# Patient Record
Sex: FEMALE | Race: WHITE | NOT HISPANIC OR LATINO | URBAN - METROPOLITAN AREA
[De-identification: names, ages, dates, MRNs, and addresses within clinical notes are randomized per-mention and may not be internally consistent; named-entity substitution may affect disease eponyms.]

---

## 2017-05-15 ENCOUNTER — APPOINTMENT (OUTPATIENT)
Dept: URBAN - METROPOLITAN AREA CLINIC 200 | Age: 77
Setting detail: DERMATOLOGY
End: 2017-05-23

## 2017-05-15 DIAGNOSIS — L57.8 OTHER SKIN CHANGES DUE TO CHRONIC EXPOSURE TO NONIONIZING RADIATION: ICD-10-CM

## 2017-05-15 DIAGNOSIS — D485 NEOPLASM OF UNCERTAIN BEHAVIOR OF SKIN: ICD-10-CM

## 2017-05-15 DIAGNOSIS — L57.0 ACTINIC KERATOSIS: ICD-10-CM

## 2017-05-15 PROBLEM — Z85.828 PERSONAL HISTORY OF OTHER MALIGNANT NEOPLASM OF SKIN: Status: ACTIVE | Noted: 2017-05-15

## 2017-05-15 PROBLEM — Z85.3 PERSONAL HISTORY OF MALIGNANT NEOPLASM OF BREAST: Status: ACTIVE | Noted: 2017-05-15

## 2017-05-15 PROBLEM — D48.5 NEOPLASM OF UNCERTAIN BEHAVIOR OF SKIN: Status: ACTIVE | Noted: 2017-05-15

## 2017-05-15 PROCEDURE — OTHER COUNSELING: OTHER

## 2017-05-15 PROCEDURE — 11100: CPT | Mod: 59

## 2017-05-15 PROCEDURE — OTHER BIOPSY BY SHAVE METHOD: OTHER

## 2017-05-15 PROCEDURE — 17000 DESTRUCT PREMALG LESION: CPT

## 2017-05-15 PROCEDURE — 99213 OFFICE O/P EST LOW 20 MIN: CPT | Mod: 25

## 2017-05-15 PROCEDURE — OTHER LIQUID NITROGEN: OTHER

## 2017-05-15 ASSESSMENT — LOCATION DETAILED DESCRIPTION DERM
LOCATION DETAILED: RIGHT SUPERIOR UPPER BACK
LOCATION DETAILED: RIGHT PROXIMAL DORSAL FOREARM
LOCATION DETAILED: RIGHT ANTERIOR SHOULDER

## 2017-05-15 ASSESSMENT — LOCATION SIMPLE DESCRIPTION DERM
LOCATION SIMPLE: RIGHT SHOULDER
LOCATION SIMPLE: RIGHT UPPER BACK
LOCATION SIMPLE: RIGHT FOREARM

## 2017-05-15 ASSESSMENT — LOCATION ZONE DERM
LOCATION ZONE: ARM
LOCATION ZONE: TRUNK

## 2017-05-15 NOTE — PROCEDURE: BIOPSY BY SHAVE METHOD
Notification Instructions: Patient will be notified of biopsy results. However, patient instructed to call the office if not contacted within 2 weeks.
Additional Anesthesia Volume In Cc (Will Not Render If 0): 0
Biopsy Type: H and E
Post-Care Instructions: I reviewed with the patient in detail post-care instructions. Patient is to keep the biopsy site dry overnight, and then apply bacitracin twice daily until healed. Patient may apply hydrogen peroxide soaks to remove any crusting.
Wound Care: Vaseline
Billing Type: Third-Party Bill
Anesthesia Volume In Cc (Will Not Render If 0): 0.1
Bill For Surgical Tray: no
Size Of Lesion In Cm: 0.3
Biopsy Method: Personna blade
Dressing: bandage
consent was obtained and risks were reviewed including but not limited to scarring, infection, bleeding, scabbing, incomplete removal, nerve damage and allergy to anesthesia.
Anesthesia Type: 1% lidocaine with epinephrine
Type Of Destruction Used: Curettage
Hemostasis: Drysol
Detail Level: Detailed

## 2019-09-04 ENCOUNTER — HOSPITAL ENCOUNTER (OUTPATIENT)
Facility: CLINIC | Age: 79
Discharge: HOME | End: 2019-09-04
Attending: FAMILY MEDICINE
Payer: COMMERCIAL

## 2019-09-04 VITALS
WEIGHT: 123 LBS | SYSTOLIC BLOOD PRESSURE: 138 MMHG | TEMPERATURE: 98.2 F | OXYGEN SATURATION: 97 % | RESPIRATION RATE: 14 BRPM | DIASTOLIC BLOOD PRESSURE: 82 MMHG | HEART RATE: 68 BPM

## 2019-09-04 DIAGNOSIS — K12.0 CANKER SORES ORAL: Primary | ICD-10-CM

## 2019-09-04 PROCEDURE — 99202 OFFICE O/P NEW SF 15 MIN: CPT | Performed by: NURSE PRACTITIONER

## 2019-09-04 PROCEDURE — S9083 URGENT CARE CENTER GLOBAL: HCPCS | Performed by: NURSE PRACTITIONER

## 2019-09-04 RX ORDER — LIDOCAINE HYDROCHLORIDE 20 MG/ML
JELLY TOPICAL AS NEEDED
Qty: 30 ML | Refills: 0 | Status: SHIPPED | OUTPATIENT
Start: 2019-09-04 | End: 2020-09-03

## 2019-09-04 ASSESSMENT — ENCOUNTER SYMPTOMS
SEIZURES: 0
ABDOMINAL PAIN: 0
COUGH: 0
SORE THROAT: 0
COLOR CHANGE: 0
ARTHRALGIAS: 0
SHORTNESS OF BREATH: 0
VOMITING: 0
PALPITATIONS: 0
EYE PAIN: 0
CHILLS: 0
BACK PAIN: 0
FEVER: 0
DYSURIA: 0
HEMATURIA: 0

## 2019-09-04 NOTE — DISCHARGE INSTRUCTIONS
Please rinse with mouthwash 2 - 4 x per day and rinse with Peroxyl by [employer] after eating.  Use Lidocaine jelly for pain to numb area.  Call a Primary Care Physician tomorrow for follow up regarding your weight loss    Thank you for choosing our Urgent Care.

## 2019-09-04 NOTE — ED PROVIDER NOTES
HPI     Chief Complaint   Patient presents with   • canker sores       Pt. Reports mouth sores and swelling x 1 month which have been unrelieved with over the counter treatments.  Pt. Reports difficulty eating and drinking but no issues swallowing.  Pt. Admits to weight loss.  Pt. Denies any hx of immunosuppression.  Pt. Does not yet have a Primary Care Physician since her PCP retired.               Patient History     No past medical history on file.    No past surgical history on file.    No family history on file.    Social History   Substance Use Topics   • Smoking status: Not on file   • Smokeless tobacco: Not on file   • Alcohol use Not on file       Systems Reviewed from Nursing Triage:  Allergies  Meds  Problems          Review of Systems     Review of Systems   Constitutional: Negative for chills and fever.   HENT: Negative for ear pain and sore throat.         Mouth sores   Eyes: Negative for pain and visual disturbance.   Respiratory: Negative for cough and shortness of breath.    Cardiovascular: Negative for chest pain and palpitations.   Gastrointestinal: Negative for abdominal pain and vomiting.   Genitourinary: Negative for dysuria and hematuria.   Musculoskeletal: Negative for arthralgias and back pain.   Skin: Negative for color change and rash.   Neurological: Negative for seizures and syncope.   All other systems reviewed and are negative.       Physical Exam     ED Triage Vitals [09/04/19 1753]   Temp Heart Rate Resp BP SpO2   36.8 °C (98.2 °F) 68 14 138/82 97 %      Temp src Heart Rate Source Patient Position BP Location FiO2 (%) (Set)   -- -- -- -- --                     Patient Vitals for the past 24 hrs:   BP Temp Pulse Resp SpO2 Weight   09/04/19 1753 138/82 36.8 °C (98.2 °F) 68 14 97 % 55.8 kg (123 lb)           Physical Exam   Constitutional: She appears well-developed and well-nourished. No distress.   HENT:   Head: Normocephalic and atraumatic.   Mouth/Throat: Uvula is midline,  oropharynx is clear and moist and mucous membranes are normal. Oral lesions present.   Mouth sores - under tongue, mild stomatitis and tenderness, moist mucus membranes   Eyes: Conjunctivae are normal.   Neck: Neck supple.   Cardiovascular: Normal rate and regular rhythm.    No murmur heard.  Pulmonary/Chest: Effort normal and breath sounds normal. No respiratory distress.   Abdominal: Soft. There is no tenderness.   Musculoskeletal: She exhibits no edema.   Neurological: She is alert.   Skin: Skin is warm and dry.   Psychiatric: She has a normal mood and affect.   Nursing note and vitals reviewed.           Procedures    ED Course & MDM     Labs Reviewed - No data to display    No orders to display               MDM  Number of Diagnoses or Management Options  Canker sores oral:   Diagnosis management comments: Canker sores  Stomatitis   Magic Mouthwash  Peroxyl rinse  Xylocaine - pain  Follow up with PCP - list provided           Clinical Impressions as of Sep 04 1827   Canker sores oral        Laureen Walker CRNP  09/04/19 1831       Laureen Walker CRNP  09/04/19 1831

## 2019-09-05 NOTE — ED ATTESTATION NOTE
Reviewed  and supervised the care ,I agreed with the assessment and plan      Isaura Enriquez MD  09/05/19 0996

## 2019-09-10 ENCOUNTER — TRANSCRIBE ORDERS (OUTPATIENT)
Dept: SCHEDULING | Age: 79
End: 2019-09-10

## 2019-09-10 DIAGNOSIS — C50.911 MALIGNANT NEOPLASM OF RIGHT FEMALE BREAST (CMS/HCC): Primary | ICD-10-CM

## 2019-09-16 ENCOUNTER — HOSPITAL ENCOUNTER (OUTPATIENT)
Dept: RADIOLOGY | Facility: HOSPITAL | Age: 79
Discharge: HOME | End: 2019-09-16
Attending: NURSE PRACTITIONER
Payer: COMMERCIAL

## 2019-09-16 DIAGNOSIS — C50.911 MALIGNANT NEOPLASM OF RIGHT FEMALE BREAST (CMS/HCC): ICD-10-CM

## 2019-09-16 PROCEDURE — 76642 ULTRASOUND BREAST LIMITED: CPT | Mod: LT

## 2019-09-16 PROCEDURE — 77066 DX MAMMO INCL CAD BI: CPT

## 2020-04-24 ENCOUNTER — APPOINTMENT (OUTPATIENT)
Dept: URBAN - METROPOLITAN AREA CLINIC 200 | Age: 80
Setting detail: DERMATOLOGY
End: 2020-04-27

## 2020-04-24 DIAGNOSIS — L57.0 ACTINIC KERATOSIS: ICD-10-CM

## 2020-04-24 PROBLEM — J30.1 ALLERGIC RHINITIS DUE TO POLLEN: Status: ACTIVE | Noted: 2020-04-24

## 2020-04-24 PROCEDURE — OTHER CONSENT FOR TELEMEDICINE VISIT OBTAINED: OTHER

## 2020-04-24 PROCEDURE — OTHER TELEHEALTH ASSESSMENT: OTHER

## 2020-04-24 PROCEDURE — OTHER PRESCRIPTION: OTHER

## 2020-04-24 PROCEDURE — 99212 OFFICE O/P EST SF 10 MIN: CPT | Mod: 95

## 2020-04-24 PROCEDURE — OTHER REASON FOR TELEMEDICINE VISIT: OTHER

## 2020-04-24 RX ORDER — FLUOROURACIL 5 MG/G
CREAM TOPICAL
Qty: 1 | Refills: 4 | Status: ERX | COMMUNITY
Start: 2020-04-24

## 2020-04-24 ASSESSMENT — LOCATION ZONE DERM: LOCATION ZONE: ARM

## 2020-04-24 ASSESSMENT — LOCATION SIMPLE DESCRIPTION DERM: LOCATION SIMPLE: RIGHT FOREARM

## 2020-04-24 ASSESSMENT — LOCATION DETAILED DESCRIPTION DERM: LOCATION DETAILED: RIGHT PROXIMAL DORSAL FOREARM

## 2020-04-24 NOTE — PROCEDURE: TELEHEALTH ASSESSMENT
Recommendation Preamble: Visit done via [facility] Recommendation Preamble: Visit done via [facility]

## 2020-06-02 ENCOUNTER — TRANSCRIBE ORDERS (OUTPATIENT)
Dept: SCHEDULING | Age: 80
End: 2020-06-02

## 2020-06-02 DIAGNOSIS — C50.911 MALIGNANT NEOPLASM OF UNSPECIFIED SITE OF RIGHT FEMALE BREAST (CMS/HCC): ICD-10-CM

## 2020-06-02 DIAGNOSIS — R92.8 OTHER ABNORMAL AND INCONCLUSIVE FINDINGS ON DIAGNOSTIC IMAGING OF BREAST: Primary | ICD-10-CM

## 2020-06-02 DIAGNOSIS — E04.1 NONTOXIC SINGLE THYROID NODULE: ICD-10-CM

## 2020-06-29 ENCOUNTER — HOSPITAL ENCOUNTER (OUTPATIENT)
Dept: RADIOLOGY | Facility: HOSPITAL | Age: 80
Discharge: HOME | End: 2020-06-29
Attending: NURSE PRACTITIONER
Payer: COMMERCIAL

## 2020-06-29 ENCOUNTER — TRANSCRIBE ORDERS (OUTPATIENT)
Dept: REGISTRATION | Facility: HOSPITAL | Age: 80
End: 2020-06-29

## 2020-06-29 DIAGNOSIS — R92.8 OTHER ABNORMAL AND INCONCLUSIVE FINDINGS ON DIAGNOSTIC IMAGING OF BREAST: ICD-10-CM

## 2020-06-29 DIAGNOSIS — C50.911 MALIGNANT NEOPLASM OF UNSPECIFIED SITE OF RIGHT FEMALE BREAST (CMS/HCC): ICD-10-CM

## 2020-06-29 DIAGNOSIS — E04.1 NONTOXIC SINGLE THYROID NODULE: ICD-10-CM

## 2020-06-29 DIAGNOSIS — R92.8 OTHER ABNORMAL AND INCONCLUSIVE FINDINGS ON DIAGNOSTIC IMAGING OF BREAST: Primary | ICD-10-CM

## 2020-06-29 PROCEDURE — 77065 DX MAMMO INCL CAD UNI: CPT | Mod: LT

## 2020-06-29 PROCEDURE — 76536 US EXAM OF HEAD AND NECK: CPT

## 2020-06-29 PROCEDURE — 76642 ULTRASOUND BREAST LIMITED: CPT | Mod: LT

## 2020-07-27 ENCOUNTER — APPOINTMENT (OUTPATIENT)
Dept: URBAN - METROPOLITAN AREA CLINIC 200 | Age: 80
Setting detail: DERMATOLOGY
End: 2020-07-29

## 2020-07-27 DIAGNOSIS — Z85.828 PERSONAL HISTORY OF OTHER MALIGNANT NEOPLASM OF SKIN: ICD-10-CM

## 2020-07-27 DIAGNOSIS — L57.8 OTHER SKIN CHANGES DUE TO CHRONIC EXPOSURE TO NONIONIZING RADIATION: ICD-10-CM

## 2020-07-27 DIAGNOSIS — Z41.9 ENCOUNTER FOR PROCEDURE FOR PURPOSES OTHER THAN REMEDYING HEALTH STATE, UNSPECIFIED: ICD-10-CM

## 2020-07-27 PROBLEM — L57.0 ACTINIC KERATOSIS: Status: ACTIVE | Noted: 2020-07-27

## 2020-07-27 PROCEDURE — 99213 OFFICE O/P EST LOW 20 MIN: CPT

## 2020-07-27 PROCEDURE — OTHER LIQUID NITROGEN (COSMETIC): OTHER

## 2020-07-27 PROCEDURE — OTHER COUNSELING: OTHER

## 2020-07-27 ASSESSMENT — LOCATION DETAILED DESCRIPTION DERM
LOCATION DETAILED: LEFT MEDIAL SUPERIOR CHEST
LOCATION DETAILED: RIGHT CENTRAL MALAR CHEEK
LOCATION DETAILED: LEFT INFERIOR ANTERIOR NECK
LOCATION DETAILED: RIGHT SUPERIOR MEDIAL MALAR CHEEK
LOCATION DETAILED: LEFT RIB CAGE

## 2020-07-27 ASSESSMENT — LOCATION ZONE DERM
LOCATION ZONE: FACE
LOCATION ZONE: TRUNK
LOCATION ZONE: NECK
LOCATION ZONE: FACE
LOCATION ZONE: TRUNK

## 2020-07-27 ASSESSMENT — LOCATION SIMPLE DESCRIPTION DERM
LOCATION SIMPLE: LEFT ANTERIOR NECK
LOCATION SIMPLE: ABDOMEN
LOCATION SIMPLE: RIGHT CHEEK
LOCATION SIMPLE: CHEST
LOCATION SIMPLE: RIGHT CHEEK

## 2020-07-27 NOTE — PROCEDURE: LIQUID NITROGEN (COSMETIC)
Price (Use Numbers Only, No Special Characters Or $): 50.00
Detail Level: Zone
Post-Care Instructions: I reviewed with the patient in detail post-care instructions. Patient is to wear sunprotection, and avoid picking at any of the treated lesions. Pt may apply Vaseline to crusted or scabbing areas.
Render Post-Care Instructions In Note?: no
Consent: The patient's consent was obtained including but not limited to risks of crusting, scabbing, blistering, scarring, darker or lighter pigmentary change, recurrence, incomplete removal and infection. The patient understands that the procedure is cosmetic in nature and is not covered by insurance.
Billing Information: Bill by [employer]

## 2021-07-26 ENCOUNTER — APPOINTMENT (OUTPATIENT)
Dept: URBAN - METROPOLITAN AREA CLINIC 200 | Age: 81
Setting detail: DERMATOLOGY
End: 2021-07-26

## 2021-07-26 DIAGNOSIS — L57.8 OTHER SKIN CHANGES DUE TO CHRONIC EXPOSURE TO NONIONIZING RADIATION: ICD-10-CM

## 2021-07-26 DIAGNOSIS — L82.0 INFLAMED SEBORRHEIC KERATOSIS: ICD-10-CM

## 2021-07-26 DIAGNOSIS — Z85.828 PERSONAL HISTORY OF OTHER MALIGNANT NEOPLASM OF SKIN: ICD-10-CM

## 2021-07-26 DIAGNOSIS — Z11.52 ENCOUNTER FOR SCREENING FOR COVID-19: ICD-10-CM

## 2021-07-26 PROBLEM — Z92.3 PERSONAL HISTORY OF IRRADIATION: Status: ACTIVE | Noted: 2021-07-26

## 2021-07-26 PROBLEM — L57.0 ACTINIC KERATOSIS: Status: ACTIVE | Noted: 2021-07-26

## 2021-07-26 PROBLEM — J30.1 ALLERGIC RHINITIS DUE TO POLLEN: Status: ACTIVE | Noted: 2021-07-26

## 2021-07-26 PROBLEM — Z85.3 PERSONAL HISTORY OF MALIGNANT NEOPLASM OF BREAST: Status: ACTIVE | Noted: 2021-07-26

## 2021-07-26 PROCEDURE — 99213 OFFICE O/P EST LOW 20 MIN: CPT

## 2021-07-26 PROCEDURE — OTHER SUNSCREEN RECOMMENDATIONS: OTHER

## 2021-07-26 PROCEDURE — OTHER SCREENING FOR COVID-19: OTHER

## 2021-07-26 PROCEDURE — OTHER COUNSELING: OTHER

## 2021-07-26 PROCEDURE — OTHER REASSURANCE: OTHER

## 2021-07-26 ASSESSMENT — LOCATION DETAILED DESCRIPTION DERM
LOCATION DETAILED: LEFT INFERIOR ANTERIOR NECK
LOCATION DETAILED: LEFT CLAVICULAR SKIN
LOCATION DETAILED: LEFT VENTRAL DISTAL FOREARM
LOCATION DETAILED: LEFT CENTRAL BUCCAL CHEEK
LOCATION DETAILED: PERIUMBILICAL SKIN

## 2021-07-26 ASSESSMENT — LOCATION SIMPLE DESCRIPTION DERM
LOCATION SIMPLE: LEFT FOREARM
LOCATION SIMPLE: ABDOMEN
LOCATION SIMPLE: LEFT CLAVICULAR SKIN
LOCATION SIMPLE: LEFT ANTERIOR NECK
LOCATION SIMPLE: LEFT CHEEK

## 2021-07-26 ASSESSMENT — LOCATION ZONE DERM
LOCATION ZONE: TRUNK
LOCATION ZONE: NECK
LOCATION ZONE: FACE
LOCATION ZONE: ARM

## 2021-11-15 ENCOUNTER — APPOINTMENT (OUTPATIENT)
Dept: URBAN - METROPOLITAN AREA CLINIC 200 | Age: 81
Setting detail: DERMATOLOGY
End: 2021-11-19

## 2021-11-15 DIAGNOSIS — L82.1 OTHER SEBORRHEIC KERATOSIS: ICD-10-CM

## 2021-11-15 DIAGNOSIS — Z11.52 ENCOUNTER FOR SCREENING FOR COVID-19: ICD-10-CM

## 2021-11-15 PROBLEM — Z85.828 PERSONAL HISTORY OF OTHER MALIGNANT NEOPLASM OF SKIN: Status: ACTIVE | Noted: 2021-11-15

## 2021-11-15 PROCEDURE — OTHER SCREENING FOR COVID-19: OTHER

## 2021-11-15 PROCEDURE — OTHER LIQUID NITROGEN (COSMETIC): OTHER

## 2021-11-15 PROCEDURE — OTHER MIPS QUALITY: OTHER

## 2021-11-15 ASSESSMENT — LOCATION DETAILED DESCRIPTION DERM
LOCATION DETAILED: RIGHT CLAVICULAR NECK
LOCATION DETAILED: RIGHT ULNAR DORSAL HAND
LOCATION DETAILED: LEFT DORSAL INDEX METACARPOPHALANGEAL JOINT
LOCATION DETAILED: LEFT CLAVICULAR NECK
LOCATION DETAILED: LEFT MEDIAL SUPERIOR CHEST
LOCATION DETAILED: LEFT SUPERIOR FOREHEAD
LOCATION DETAILED: LEFT CLAVICULAR SKIN
LOCATION DETAILED: RIGHT DORSAL MIDDLE FINGER METACARPOPHALANGEAL JOINT
LOCATION DETAILED: RIGHT CLAVICULAR SKIN
LOCATION DETAILED: LEFT SUPERIOR LATERAL MIDBACK
LOCATION DETAILED: RIGHT INFERIOR UPPER BACK
LOCATION DETAILED: RIGHT DORSAL INDEX FINGER METACARPOPHALANGEAL JOINT
LOCATION DETAILED: PERIUMBILICAL SKIN
LOCATION DETAILED: RIGHT DISTAL DORSAL FOREARM
LOCATION DETAILED: RIGHT INFERIOR LATERAL NECK

## 2021-11-15 ASSESSMENT — LOCATION ZONE DERM
LOCATION ZONE: TRUNK
LOCATION ZONE: ARM
LOCATION ZONE: FACE
LOCATION ZONE: HAND
LOCATION ZONE: NECK

## 2021-11-15 ASSESSMENT — PAIN INTENSITY VAS: HOW INTENSE IS YOUR PAIN 0 BEING NO PAIN, 10 BEING THE MOST SEVERE PAIN POSSIBLE?: NO PAIN

## 2021-11-15 ASSESSMENT — LOCATION SIMPLE DESCRIPTION DERM
LOCATION SIMPLE: LEFT CLAVICULAR SKIN
LOCATION SIMPLE: RIGHT FOREARM
LOCATION SIMPLE: LEFT LOWER BACK
LOCATION SIMPLE: LEFT FOREHEAD
LOCATION SIMPLE: LEFT HAND
LOCATION SIMPLE: RIGHT ANTERIOR NECK
LOCATION SIMPLE: CHEST
LOCATION SIMPLE: RIGHT UPPER BACK
LOCATION SIMPLE: LEFT ANTERIOR NECK
LOCATION SIMPLE: RIGHT HAND
LOCATION SIMPLE: ABDOMEN
LOCATION SIMPLE: RIGHT CLAVICULAR SKIN

## 2021-11-15 NOTE — PROCEDURE: LIQUID NITROGEN (COSMETIC)
Detail Level: Generalized
Post-Care Instructions: I reviewed with the patient in detail post-care instructions. Patient is to wear sunprotection, and avoid picking at any of the treated lesions. Pt may apply Vaseline to crusted or scabbing areas.
Price (Use Numbers Only, No Special Characters Or $): 190
Consent: The patient's consent was obtained including but not limited to risks of crusting, scabbing, blistering, scarring, darker or lighter pigmentary change, recurrence, incomplete removal and infection. The patient understands that the procedure is cosmetic in nature and is not covered by insurance.
Render Post-Care Instructions In Note?: no
Billing Information: Bill by [employer]

## 2021-11-22 ENCOUNTER — HOSPITAL ENCOUNTER (OUTPATIENT)
Dept: RADIOLOGY | Facility: HOSPITAL | Age: 81
Discharge: HOME | End: 2021-11-22
Attending: NURSE PRACTITIONER
Payer: COMMERCIAL

## 2021-11-22 ENCOUNTER — TRANSCRIBE ORDERS (OUTPATIENT)
Dept: SCHEDULING | Age: 81
End: 2021-11-22

## 2021-11-22 DIAGNOSIS — R53.83 OTHER FATIGUE: ICD-10-CM

## 2021-11-22 DIAGNOSIS — Z12.31 ENCOUNTER FOR SCREENING MAMMOGRAM FOR MALIGNANT NEOPLASM OF BREAST: ICD-10-CM

## 2021-11-22 DIAGNOSIS — Z13.820 ENCOUNTER FOR SCREENING FOR OSTEOPOROSIS: ICD-10-CM

## 2021-11-22 DIAGNOSIS — Z12.31 ENCOUNTER FOR SCREENING MAMMOGRAM FOR MALIGNANT NEOPLASM OF BREAST: Primary | ICD-10-CM

## 2021-11-22 PROCEDURE — 77066 DX MAMMO INCL CAD BI: CPT

## 2021-11-22 PROCEDURE — 77080 DXA BONE DENSITY AXIAL: CPT

## 2021-11-22 PROCEDURE — 76642 ULTRASOUND BREAST LIMITED: CPT | Mod: 50

## 2021-11-29 ENCOUNTER — PATIENT OUTREACH (OUTPATIENT)
Dept: RADIOLOGY | Facility: HOSPITAL | Age: 81
End: 2021-11-29

## 2021-11-30 ENCOUNTER — TRANSCRIBE ORDERS (OUTPATIENT)
Dept: RADIOLOGY | Facility: HOSPITAL | Age: 81
End: 2021-11-30
Payer: COMMERCIAL

## 2021-11-30 DIAGNOSIS — R92.8 OTHER ABNORMAL AND INCONCLUSIVE FINDINGS ON DIAGNOSTIC IMAGING OF BREAST: Primary | ICD-10-CM

## 2021-12-06 ENCOUNTER — HOSPITAL ENCOUNTER (OUTPATIENT)
Dept: RADIOLOGY | Facility: HOSPITAL | Age: 81
Discharge: HOME | End: 2021-12-06
Attending: SURGERY
Payer: COMMERCIAL

## 2021-12-06 VITALS — DIASTOLIC BLOOD PRESSURE: 72 MMHG | SYSTOLIC BLOOD PRESSURE: 177 MMHG

## 2021-12-06 DIAGNOSIS — R92.8 OTHER ABNORMAL AND INCONCLUSIVE FINDINGS ON DIAGNOSTIC IMAGING OF BREAST: ICD-10-CM

## 2021-12-06 PROCEDURE — 76942 ECHO GUIDE FOR BIOPSY: CPT | Mod: RT

## 2021-12-06 PROCEDURE — 77065 DX MAMMO INCL CAD UNI: CPT | Mod: RT

## 2021-12-06 PROCEDURE — 0HBT3ZX EXCISION OF RIGHT BREAST, PERCUTANEOUS APPROACH, DIAGNOSTIC: ICD-10-PCS | Performed by: STUDENT IN AN ORGANIZED HEALTH CARE EDUCATION/TRAINING PROGRAM

## 2021-12-06 PROCEDURE — BH40ZZZ ULTRASONOGRAPHY OF RIGHT BREAST: ICD-10-PCS | Performed by: STUDENT IN AN ORGANIZED HEALTH CARE EDUCATION/TRAINING PROGRAM

## 2021-12-06 PROCEDURE — 88360 TUMOR IMMUNOHISTOCHEM/MANUAL: CPT | Performed by: SURGERY

## 2021-12-06 PROCEDURE — 25000000 HC PHARMACY GENERAL: Performed by: STUDENT IN AN ORGANIZED HEALTH CARE EDUCATION/TRAINING PROGRAM

## 2021-12-06 RX ORDER — LIDOCAINE HYDROCHLORIDE AND EPINEPHRINE 10; 10 UG/ML; MG/ML
10 INJECTION, SOLUTION INFILTRATION; PERINEURAL ONCE
Status: COMPLETED | OUTPATIENT
Start: 2021-12-06 | End: 2021-12-06

## 2021-12-06 RX ORDER — LIDOCAINE HYDROCHLORIDE 10 MG/ML
2 INJECTION, SOLUTION EPIDURAL; INFILTRATION; INTRACAUDAL; PERINEURAL ONCE
Status: COMPLETED | OUTPATIENT
Start: 2021-12-06 | End: 2021-12-06

## 2021-12-06 RX ADMIN — LIDOCAINE HYDROCHLORIDE 2 ML: 10 INJECTION, SOLUTION EPIDURAL; INFILTRATION; INTRACAUDAL; PERINEURAL at 11:15

## 2021-12-06 RX ADMIN — LIDOCAINE HYDROCHLORIDE,EPINEPHRINE BITARTRATE 10 ML: 10; .01 INJECTION, SOLUTION INFILTRATION; PERINEURAL at 11:15

## 2021-12-06 NOTE — POST-PROCEDURE NOTE
Immediate Breast Interventional Postprocedure Note    Liz Hernandezman     Attending: Imani Portillo MD    Assistant: Technologist      Diagnosis: Breast Abnormality    Description of procedure: Imaging Guided Percutaneous Breast Biopsy     Laterality: Right    Anesthesia:  Local (Lidocaine)    Findings: Breast Abnormality    Complications: None    Estimated Blood Loss: Less than 100 ml    Specimens: Breast Tissue      12/6/2021 11:15 AM

## 2021-12-10 LAB
CASE RPRT: NORMAL
CLINICAL INFO: NORMAL
PATH REPORT.ADDENDUM SPEC: NORMAL
PATH REPORT.FINAL DX SPEC: NORMAL
PATH REPORT.FINAL DX SPEC: NORMAL
PATH REPORT.GROSS SPEC: NORMAL

## 2021-12-13 ENCOUNTER — PATIENT OUTREACH (OUTPATIENT)
Dept: RADIOLOGY | Facility: HOSPITAL | Age: 81
End: 2021-12-13
Payer: COMMERCIAL

## 2021-12-20 ENCOUNTER — HOSPITAL ENCOUNTER (OUTPATIENT)
Dept: RADIOLOGY | Age: 81
Discharge: HOME | End: 2021-12-20
Attending: NURSE PRACTITIONER
Payer: COMMERCIAL

## 2021-12-20 DIAGNOSIS — R53.83 OTHER FATIGUE: ICD-10-CM

## 2021-12-20 PROCEDURE — 76536 US EXAM OF HEAD AND NECK: CPT

## 2021-12-29 RX ORDER — FLUOROURACIL 50 MG/G
1 CREAM TOPICAL DAILY
COMMUNITY
End: 2022-01-03 | Stop reason: ALTCHOICE

## 2021-12-29 RX ORDER — MULTIVITAMIN
1 TABLET ORAL DAILY
COMMUNITY

## 2021-12-30 ENCOUNTER — DOCUMENTATION (OUTPATIENT)
Dept: RADIATION ONCOLOGY | Facility: HOSPITAL | Age: 81
End: 2021-12-30
Payer: COMMERCIAL

## 2021-12-30 NOTE — PROGRESS NOTES
Pt with hx Breast Ca 1986 to Right breast.  Had Lumpectomy/LN dissection/Radiation.  + Lymphedema in past.    11/22/21 DX B/L Mammo: hypoechoic mass at the 12:00 axis in the Right breast 6 x 6 x  6 mm.    12/6/21 US bx (Dr. 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  ER/% positive.  HER 2 negative.  Ki-67: 25%   12/9/21 Dr Toth:  Would prefer radiation/medonc consult prior to decision for sx..     12/9/21 Dr Toth:  Would prefer radiation/medonc consult prior to decision for sx..                           -

## 2022-01-01 PROBLEM — C50.811 MALIGNANT NEOPLASM OF OVERLAPPING SITES OF RIGHT BREAST IN FEMALE, ESTROGEN RECEPTOR POSITIVE (CMS/HCC): Status: ACTIVE | Noted: 2022-01-01

## 2022-01-01 PROBLEM — Z17.0 MALIGNANT NEOPLASM OF OVERLAPPING SITES OF RIGHT BREAST IN FEMALE, ESTROGEN RECEPTOR POSITIVE (CMS/HCC): Status: ACTIVE | Noted: 2022-01-01

## 2022-01-02 NOTE — PROGRESS NOTES
Review of Systems   Constitutional: Negative.    HENT:  Negative.    Respiratory: Negative.    Cardiovascular: Negative.    Gastrointestinal: Negative.    Genitourinary: Negative.     Musculoskeletal: Positive for arthralgias and myalgias (right sided s/p trip and fall at work ).        Plan for CXR in 1 week if no improvement by primary care team   Skin: Negative.    Neurological: Negative.    Hematological:        Hematoma/bruise on RLE d/t trip/fall at work   Psychiatric/Behavioral: Negative.       Patient with history of right breast cancer in 1986 s/p lumpectomy with LN dissection (Dr Landers) and radiation therapy (Dr Tai). No reported hormone therapy.   Post treatment lymphadema and intermittent cellulitis to RUE requiring hospitalizations at [place]     Patient had routine B/L Dx Mammo with US ([facility]) in 9/2019 which showed stable right breast and probable area of fibrocystic changes on the left with a questionable area of architectural distortion without calcifications.     Follow up Left Dx Mammo with US ([facility] was done on 6/2/20 which showed stable findings with recommendation to follow up in 6 months     On 11/22/21 a follow up DX B/L Mammo with US ([facility]) was done showing benign etiology at left breast masses and on the right breast adjacent to the lumpectomy scar an irregular hypoechoic 6mm mass at 12:00    Patient was seen in consultation by Dr Toth (Surgery) with US guided right breast biopsy performed at [place] on 12/6/21 with final pathology confirming grade 2 invasive ductal carcinoma (%, %, Her2 - (+1), Ki-67 25%) with lymphatic invasion present, core at least 8mm    Patient had follow up with Dr Toth (Surgery) on 12/9/21 with plan for referral for consultation with radiation (Dr Medel) and medical oncology prior to surgical decision    Recent DEXA ([facility]) on 11/22/21 showed osteopenia

## 2022-01-03 ENCOUNTER — DOCUMENTATION (OUTPATIENT)
Dept: RADIATION ONCOLOGY | Facility: HOSPITAL | Age: 82
End: 2022-01-03
Payer: COMMERCIAL

## 2022-01-03 ENCOUNTER — OFFICE VISIT (OUTPATIENT)
Dept: HEMATOLOGY/ONCOLOGY | Facility: CLINIC | Age: 82
End: 2022-01-03
Attending: INTERNAL MEDICINE
Payer: COMMERCIAL

## 2022-01-03 ENCOUNTER — HOSPITAL ENCOUNTER (OUTPATIENT)
Dept: RADIATION ONCOLOGY | Facility: HOSPITAL | Age: 82
Setting detail: RADIATION/ONCOLOGY SERIES
Discharge: HOME | End: 2022-01-03
Attending: RADIOLOGY
Payer: COMMERCIAL

## 2022-01-03 VITALS
WEIGHT: 129.8 LBS | TEMPERATURE: 97.8 F | DIASTOLIC BLOOD PRESSURE: 92 MMHG | HEART RATE: 72 BPM | BODY MASS INDEX: 25.48 KG/M2 | HEIGHT: 60 IN | SYSTOLIC BLOOD PRESSURE: 164 MMHG | OXYGEN SATURATION: 98 %

## 2022-01-03 VITALS
BODY MASS INDEX: 25.35 KG/M2 | OXYGEN SATURATION: 99 % | HEIGHT: 60 IN | SYSTOLIC BLOOD PRESSURE: 151 MMHG | HEART RATE: 72 BPM | DIASTOLIC BLOOD PRESSURE: 74 MMHG

## 2022-01-03 DIAGNOSIS — Z17.0 MALIGNANT NEOPLASM OF OVERLAPPING SITES OF RIGHT BREAST IN FEMALE, ESTROGEN RECEPTOR POSITIVE (CMS/HCC): Primary | ICD-10-CM

## 2022-01-03 DIAGNOSIS — C50.811 MALIGNANT NEOPLASM OF OVERLAPPING SITES OF RIGHT BREAST IN FEMALE, ESTROGEN RECEPTOR POSITIVE (CMS/HCC): Primary | ICD-10-CM

## 2022-01-03 PROCEDURE — 99215 OFFICE O/P EST HI 40 MIN: CPT | Performed by: INTERNAL MEDICINE

## 2022-01-03 PROCEDURE — 3008F BODY MASS INDEX DOCD: CPT | Performed by: INTERNAL MEDICINE

## 2022-01-03 RX ORDER — ACETAMINOPHEN 500 MG
500 TABLET ORAL EVERY 6 HOURS PRN
COMMUNITY

## 2022-01-03 RX ORDER — IBUPROFEN 200 MG
200 TABLET ORAL EVERY 6 HOURS PRN
COMMUNITY

## 2022-01-03 RX ORDER — CALCIUM CARBONATE 200(500)MG
1 TABLET,CHEWABLE ORAL AS NEEDED
COMMUNITY

## 2022-01-03 RX ORDER — CEPHALEXIN 500 MG/1
500 CAPSULE ORAL 4 TIMES DAILY
Status: ON HOLD | COMMUNITY
End: 2022-02-15 | Stop reason: ALTCHOICE

## 2022-01-03 ASSESSMENT — ENCOUNTER SYMPTOMS
GASTROINTESTINAL NEGATIVE: 1
LEG SWELLING: 1
CONSTITUTIONAL NEGATIVE: 1
EXTREMITY WEAKNESS: 0
ARTHRALGIAS: 1
PALPITATIONS: 0
MYALGIAS: 1
DIZZINESS: 0
RESPIRATORY NEGATIVE: 1
PSYCHIATRIC NEGATIVE: 1
GASTROINTESTINAL NEGATIVE: 1
ARTHRALGIAS: 1
NEUROLOGICAL NEGATIVE: 1
CARDIOVASCULAR NEGATIVE: 1
MYALGIAS: 1
EYE PROBLEMS: 0
OCCASIONAL FEELINGS OF UNSTEADINESS: 0
CONSTITUTIONAL NEGATIVE: 1
PSYCHIATRIC NEGATIVE: 1
HEADACHES: 0
RESPIRATORY NEGATIVE: 1
LIGHT-HEADEDNESS: 0

## 2022-01-03 ASSESSMENT — PAIN SCALES - GENERAL
PAINLEVEL: 4
PAINLEVEL: 3

## 2022-01-03 NOTE — ASSESSMENT & PLAN NOTE
Her clinical history as well as imaging studies and recent biopsy results were reviewed with her.  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  However, I do not have any records to confirm this.  She currently has evidence of a invasive primary within the right treated breast.  We discussed that the standard would be for her to undergo a mastectomy.  She is not inclined to be interested in a mastectomy.  She would prefer an alternative treatment option considering her age and where she is and in her life.  She could undergo excision provided all the tumor was removed.  She may have some issues with healing considering its radiated tissue.  She cannot be reirradiated after surgery.  She is a candidate for endocrine therapy.  She did have a recent DEXA scan.  She has significant osteopenia on the verge of osteoporosis.  Thus I would lean towards a couple years of tamoxifen and consideration of a bone strengthening agent prior to reevaluating her DEXA scan and considering conversion over to an aromatase inhibitor.  Although she could be started on aromatase inhibitor with a bone strengthening agent I would have some concerns about increasing her risk for fracture as she gets older.    At this time she is going to see Dr. Toth regarding surgery.  I anticipate seeing her after surgery to review the pathology and then to make further recommendations on her endocrine therapy.  All questions answered.

## 2022-01-03 NOTE — PROGRESS NOTES
"Review of Systems   Constitutional: Negative.    HENT:  Negative.    Eyes: Negative for eye problems (wears contacts).   Respiratory: Negative.    Cardiovascular: Positive for leg swelling (right leg from fall - had hematoma). Negative for palpitations (No pacemaker).   Gastrointestinal: Negative.    Musculoskeletal: Positive for arthralgias (Right side from fall 12/15/21   pain 4/10.  Pt never had CXR to r/o fx.) and myalgias.   Skin: Negative.    Neurological: Negative for dizziness, extremity weakness, headaches and light-headedness.   Psychiatric/Behavioral: Negative.    Lymphedema controlled as / pt.  6 times in the hospital with cellultis to right arm.  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  Pt with HTN - suggested seeing Dr. Young's NP regarding.  To see Dr. Solis for consult today.  "

## 2022-01-03 NOTE — PROGRESS NOTES
Liz Kebede is a 81 y.o. female,   :  1940  REFERRING PHYSICIAN:   No referring provider defined for this encounter.  PRIMARY CARE PROVIDER:  Shaneka Gomez CRNP    Encounter Diagnosis   Name Primary?   • Malignant neoplasm of overlapping sites of right breast in female, estrogen receptor positive (CMS/HCC) Yes     Patient Active Problem List   Diagnosis   • Malignant neoplasm of overlapping sites of right breast in female, estrogen receptor positive (CMS/HCC)     Cancer Staging  Malignant neoplasm of overlapping sites of right breast in female, estrogen receptor positive (CMS/HCC)  Staging form: Breast, AJCC 8th Edition  - Clinical stage from 2021: Stage IA (cT1b, cN0, cM0, G2, ER+, NV+, HER2-) - Signed by Sole Solis MD on 1/3/2022            Oncology History   Malignant neoplasm of overlapping sites of right breast in female, estrogen receptor positive (CMS/HCC)   2021 Biopsy    Final Diagnosis   A.  Right breast mass, 12:00, 1cm from nipple, biopsy (bowtie clip):    Invasive ductal carcinoma.     - Size: at least 8mm in the core biopsy.     - Histologic grade 2.     - Lymphatic invasion present.      ER +100%, NV +100%, HER-2/vince negative at +1     2021 Cancer Staged    Staging form: Breast, AJCC 8th Edition  - Clinical stage from 2021: Stage IA (cT1b, cN0, cM0, G2, ER+, NV+, HER2-)     2022 Initial Diagnosis    Malignant neoplasm of overlapping sites of right breast in female, estrogen receptor positive (CMS/HCC)         History of Present Illness    Liz Kebede is seen today at the request of No referring provider defined for this encounter. for   Encounter Diagnosis   Name Primary?   • Malignant neoplasm of overlapping sites of right breast in female, estrogen receptor positive (CMS/HCC) Yes   .    Records reviewed.    She presents here today to discuss her recently diagnosed right breast carcinoma.  She was seen earlier today by radiation oncology.   She was referred by surgery to both radiation and medical oncology.    She indicates that 1986 she had a cancer in the right breast.  We do not have those results available.  However with questioning she does remember them telling her it did not invade through the wall.  She was treated with surgery.  She said it took 2 surgeries to have it removed completely.  Although she was not offered a mastectomy she knew then that she would never want a mastectomy.  Following surgery she was treated with radiation.  There was no discussion regarding the need for adjuvant systemic therapy including endocrine therapy.  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  However I do not have any records to confirm that.  She does note that the surgeon was Dr. Hiram Landers and that the radiation oncologist was Dr. Tai.  She has had issues with some lymphedema and infections within the right upper extremity post surgery.    She has been monitored.  She has had her mammograms.  She says she  used to getting recalled and having ultrasounds done.  Most recently she is required additional imaging of the left breast. Patient had routine B/L Dx Mammo with US ([facility]) in 9/2019 which showed stable right breast and probable area of fibrocystic changes on the left with a questionable area of architectural distortion without calcifications.      Follow up Left Dx Mammo with US ([facility] was done on 6/2/20 which showed stable findings with recommendation to follow up in 6 months     On 11/22/21 a follow up DX B/L Mammo with US ([facility]) was done showing benign etiology at left breast masses and on the right breast adjacent to the lumpectomy scar an irregular hypoechoic 6mm mass at 12:00. She was seen by surgery and underwent an ultrasound right guided breast biopsy on 12/6/2021 with final pathology confirming grade 2 invasive ductal carcinoma which was ER +100%, VT +100% and HER-2 negative at +1.  Ki-67 was 25%.  There was lymphatic invasion present.  The core  was at least 8 mm.      She was referred to both radiation and medical oncology.  She did meet with radiation oncology earlier today.  She indicates she is not inclined to desire mastectomy.  At her age and her current stage of life she would prefer not to have a mastectomy.  Radiation informed her that they could not reirradiate her.  Recommended that she did not wish to have a mastectomy to have an excision followed by endocrine therapy.      Review of Systems - Oncology   Review of Systems   Constitutional: Negative.    HENT:  Negative.    Respiratory: Negative.    Cardiovascular: Negative.    Gastrointestinal: Negative.    Genitourinary: Negative.     Musculoskeletal: Positive for arthralgias and myalgias (right sided s/p trip and fall at work ).        Plan for CXR in 1 week if no improvement by primary care team   Skin: Negative.    Neurological: Negative.    Hematological:        Hematoma/bruise on RLE d/t trip/fall at work   Psychiatric/Behavioral: Negative.       Patient with history of right breast cancer in 1986 s/p lumpectomy with LN dissection (Dr Landers) and radiation therapy (Dr Tai). No reported hormone therapy.   Post treatment lymphadema and intermittent cellulitis to RUE requiring hospitalizations at [place]      Patient had routine B/L Dx Mammo with US ([facility]) in 9/2019 which showed stable right breast and probable area of fibrocystic changes on the left with a questionable area of architectural distortion without calcifications.      Follow up Left Dx Mammo with US ([facility] was done on 6/2/20 which showed stable findings with recommendation to follow up in 6 months     On 11/22/21 a follow up DX B/L Mammo with US ([facility]) was done showing benign etiology at left breast masses and on the right breast adjacent to the lumpectomy scar an irregular hypoechoic 6mm mass at 12:00     Patient was seen in consultation by Dr Toth (Surgery) with US guided right breast biopsy performed at [facility] on 12/6/21 with final  pathology confirming grade 2 invasive ductal carcinoma (%, %, Her2 - (+1), Ki-67 25%) with lymphatic invasion present, core at least 8mm     Patient had follow up with Dr Toth (Surgery) on 12/9/21 with plan for referral for consultation with radiation (Dr Medel) and medical oncology prior to surgical decision    Recent DEXA (ML) on 11/22/21 showed osteopenia  Review of Systems:  Nursing assessment reviewed. Pertinent positive and negative symptoms noted in HPI, all others negative.    Temp:  [36.6 °C (97.8 °F)] 36.6 °C (97.8 °F)  Heart Rate:  [72] 72  BP: (151-164)/(74-92) 151/74  Visit Vitals  BP (!) 151/74   Pulse 72   Ht 1.524 m (5')   SpO2 99% Comment: RA   BMI 25.35 kg/m²     Physical Exam  Vitals and nursing note reviewed.   Constitutional:       General: She is not in acute distress.     Appearance: Normal appearance. She is well-developed. She is not ill-appearing.   HENT:      Head: Normocephalic and atraumatic.   Eyes:      General: No scleral icterus.     Extraocular Movements: Extraocular movements intact.   Cardiovascular:      Rate and Rhythm: Normal rate and regular rhythm.      Heart sounds: Normal heart sounds. No murmur heard.  Pulmonary:      Effort: Pulmonary effort is normal. No respiratory distress.      Breath sounds: Normal breath sounds. No wheezing, rhonchi or rales.   Chest:      Comments: Left breast without any palpable masses nipple discharge axillary adenopathy.  Right breast has some scar tissue under previous incision site.  Small area of bruising where she had a recent biopsy.  No discrete palpable mass.  No palpable adenopathy  Abdominal:      General: Bowel sounds are normal. There is no distension.      Palpations: Abdomen is soft. There is no mass.      Tenderness: There is no abdominal tenderness.   Musculoskeletal:      Cervical back: Neck supple.      Right lower leg: No edema.      Left lower leg: No edema.   Lymphadenopathy:      Cervical: No cervical  adenopathy.   Skin:     General: Skin is warm and dry.      Coloration: Skin is not jaundiced.   Neurological:      Mental Status: She is alert and oriented to person, place, and time.   Psychiatric:         Mood and Affect: Mood normal.         Behavior: Behavior normal.         Past Medical History:   Diagnosis Date   • Breast cancer (CMS/HCC)     Right breast   • Breast cancer (CMS/HCC)     Right breast   • History of radiation therapy     Right breast.  Dr. Tai   • Lipid disorder    • Lymphedema    • Osteopenia    • Skin cancer     Nose - Mohs    SCCA       Past Surgical History:   Procedure Laterality Date   • ADENOIDECTOMY     • BREAST BIOPSY Right    • BREAST LUMPECTOMY Right    • CHOLECYSTECTOMY     • COLONOSCOPY     • FRACTURE SURGERY Left     wrist   • MOHS SURGERY      Nose for SCCA   • SKIN BIOPSY     • TONSILLECTOMY         OB History    Para Term  AB Living   3 2 2         SAB IAB Ectopic Multiple Live Births                  # Outcome Date GA Lbr Brayan/2nd Weight Sex Delivery Anes PTL Lv   3             2 Term            1 Term               Obstetric Comments   Gynecologic History:   Age at menarche: 12   Age at first live birth: 30   Age at menopause: 46       Breast Risk Assessment:           Social History     Tobacco Use   • Smoking status: Never Smoker   • Smokeless tobacco: Never Used   Substance Use Topics   • Alcohol use: Yes     Alcohol/week: 2.0 standard drinks     Types: 2 Glasses of wine per week   • Drug use: Defer       Family History   Problem Relation Age of Onset   • Liver cancer Biological Mother    • Heart disease Biological Mother    • Kidney disease Biological Mother    • Hypertension Biological Father    • Heart disease Biological Father    • Throat cancer Biological Sister    • Heart disease Biological Brother    • No Known Problems Maternal Grandmother    • No Known Problems Maternal Grandfather    • No Known Problems Paternal  Grandmother    • No Known Problems Paternal Grandfather        Allergies  Patient has no known allergies.    Medications  Current Outpatient Medications   Medication Sig Dispense Refill   • acetaminophen 500 mg tablet Take 500 mg by mouth every 6 (six) hours as needed for mild pain.     • calcium carbonate 200 mg calcium (500 mg) chewable tablet Take 1 tablet by mouth as needed for indigestion or heartburn.     • cephalexin 500 mg capsule Take 500 mg by mouth 4 (four) times a day.     • ibuprofen 200 mg tablet Take 200 mg by mouth every 6 (six) hours as needed for mild pain.     • multivitamin tablet Take 1 tablet by mouth daily.     • NOT IN DATABASE [employer] cream       No current facility-administered medications for this visit.        Laboratory  No results found for this or any previous visit (from the past 672 hour(s)).    Radiology    ULTRASOUND THYROID    Result Date: 12/20/2021  Narrative: CLINICAL HISTORY:      Thyroid nodule follow-up COMMENT: A thyroid ultrasound was performed with grayscale and color Doppler imaging. Comparison study: Thyroid ultrasound study dated 6/29/2020. RIGHT: The right thyroid lobe measures 3.8 x 1.0 x 1.8 cm (previously 4.1 x 1.3 x 1.8 cm).  There are two documented right-sided nodules, as described below. Nodule #1 Location: Right upper pole. Size: 1.5 x 0.8 x 1.1 cm (previously 1.5 x 0.8 x 1.1 cm in June 2020). Notable characteristics (Echogenicity/Margins/Halo/Calcifications/Vascularity): Solid, heterogeneously isoechoic, wider than tall nodule smooth margins and no microcalcifications.  TIRADS Category 3 Comparison with prior nodule: Not significantly changed. Nodule #2 Location: Mid right lobe. Size: 1.4 x 0.6 x 1.1 cm (previously 1.3 x 0.5 x 1.1 cm in June 2020). Notable characteristics (Echogenicity/Margins/Halo/Calcifications/Vascularity): Predominantly solid with small cystic space, heterogeneously isoechoic, wider than tall nodule with smooth margins and no definite  microcalcifications. TIRADS Category 3. Comparison with prior nodule: Not significantly changed. LEFT: The left thyroid lobe measures 3.3 x 0.7 x 1.2 cm (previously 3.4 x 0.7 x 1.1 cm).  Two small nodules documented in the left lower pole, as described below. Nodule #1 Location: Left lower pole. Size: 0.5 x 0.2 x 0.4 cm (previously 0.4 x 0.2 x 0.3 cm). Notable characteristics (Echogenicity/Margins/Halo/Calcifications/Vascularity): Solid, hypoechoic, wider than tall nodule with smooth margins and no microcalcifications.  TIRADS Category 4. Comparison with prior nodule: Not significantly changed allowing for slight differences in measuring technique and scan plane. Nodule #2 Location: Left lower pole. Size: 0.3 x 0.2 x 0.3 cm. Notable characteristics (Echogenicity/Margins/Halo/Calcifications/Vascularity): Potentially solid, hypoechoic, wider than tall nodule with smooth margins and no microcalcifications.  TIRADS Category 4 Comparison with prior nodule: New or more conspicuous. ISTHMUS: The isthmus measures 0.3 cm in thickness (previously 0.3 cm).  No documented isthmic nodules. ADDITIONAL COMMENTS: No hypervascularity of the thyroid gland.  No documented regional adenopathy.     Impression: IMPRESSION: Bilateral thyroid nodules, as described above.  No significant interval change aside from a newly documented 3 mm nodule in the left lower pole.  These do not meet imaging criteria for biopsy at this time. Annual sonographic surveillance recommended. TI-RADS   TR3 - MILDLY SUSPICIOUS - FNA IF EQUAL TO OR GREATER THAN 2.5 CM; FOLLOW IF EQUAL TO OR GREATER THAN 1.5 CM TI-RAD  TR4 - MODERATELY SUSPICIOUS - FNA IF EQUAL TO OR GREATER THAN 1.5 CM; FOLLOW IF EQUAL TO OR GREATER THAN 1 CM.     ULTRASOUND GUIDED BREAST BIOPSY OR ASPIRATION RIGHT, BI DIAGNOSTIC MAMMOGRAM RIGHT (WITHOUT TOMOSYNTHESIS)    Addendum Date: 12/7/2021 Addendum:   Final pathology results: Right breast mass, 12:00, 1cm from nipple, biopsy (bowtie clip):  Invasive ductal carcinoma. Lymphatic invasion present. Pathology results are malignant and concordant with the imaging appearance. Management per surgical oncology is recommended.     Result Date: 12/7/2021  Narrative: CLINICAL HISTORY: The patient presents for ultrasound guided core needle biopsy of a mass in the right breast at 12:00, 1 cm from the nipple. The patient's prior imaging was reviewed. COMMENT: The procedure was explained to the patient, including benefits and alternatives. The risks, including but not limited to infection and bleeding, were reviewed, and the patient agreed to undergo the procedure, signing the consent form. The patient also consented to clip placement. Medication reconciliation and universal time out protocol, including confirmation of patient identity and laterality, were performed prior to beginning the procedure. Targeted ultrasound of the right breast redemonstrates a mass in the 12:00 position, 1 cm from the nipple. This was targeted sonographically and an appropriate skin site was marked. The site was prepped and anesthetized with 3 mL of 1% lidocaine. Deep local anesthesia about the biopsy site was administered using 10 mL of 1% lidocaine with epinephrine. A small incision was made in the breast through which a 12 gauge [employer] vacuum assisted core needle was placed into the targeted area under real-time ultrasound guidance, and multiple samples were obtained.  The target could be seen being sampled in real time. A bowtie clip was placed at the site of the core biopsy. Hemostasis was achieved with manual compression. The final debriefing was performed.  The mammotomy incision was closed with Steri-Strips. Post procedure unilateral unilateral digital mammogram demonstrated that the clip is in the expected location. The patient did not experience any immediate complications. Estimated blood loss is less than 100 cc. The patient was given complete instructions including post  procedure care of the biopsy site, in written and verbal form, and was to be seen in followup by Dr. Sriram Toth. The material was sent to pathology for further evaluation. The report will be addended pending final pathology results.     Impression: IMPRESSION: Ultrasound guided core needle biopsy of right breast mass at 12:00, 1 cm from the nipple was performed successfully as described above. A bowtie clip was shown to be at the expected position on the post-biopsy mammogram.       Assessment and Plan  Malignant neoplasm of overlapping sites of right breast in female, estrogen receptor positive (CMS/HCC)  Her clinical history as well as imaging studies and recent biopsy results were reviewed with her.  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  However, I do not have any records to confirm this.  She currently has evidence of a invasive primary within the right treated breast.  We discussed that the standard would be for her to undergo a mastectomy.  She is not inclined to be interested in a mastectomy.  She would prefer an alternative treatment option considering her age and where she is and in her life.  She could undergo excision provided all the tumor was removed.  She may have some issues with healing considering its radiated tissue.  She cannot be reirradiated after surgery.  She is a candidate for endocrine therapy.  She did have a recent DEXA scan.  She has significant osteopenia on the verge of osteoporosis.  Thus I would lean towards a couple years of tamoxifen and consideration of a bone strengthening agent prior to reevaluating her DEXA scan and considering conversion over to an aromatase inhibitor.  Although she could be started on aromatase inhibitor with a bone strengthening agent I would have some concerns about increasing her risk for fracture as she gets older.    At this time she is going to see Dr. Toth regarding surgery.   I anticipate seeing her after surgery to review the pathology and then to make further recommendations on her endocrine therapy.  All questions answered.      I spent 60 minutes on this date of service performing the following activities: obtaining history, performing examination, entering orders, documenting, preparing for visit, obtaining / reviewing records, providing counseling and education, communicating results and coordinating care.    Sole Solis MD

## 2022-01-03 NOTE — LETTER
January 11, 2022                                                          Patient: Liz Kebede   YOB: 1940   Date of Visit: 1/3/2022       Dear Dr. Gomez:    The patient is seen at the [facility] today. Attached is my assessment and plan of care.  Thank you for the opportunity to share in Liz Kebede's care.       Sincerely,        lLuvia Medel MD      CC: No Recipients

## 2022-01-03 NOTE — PROGRESS NOTES
Patient ID:  Liz Kebede is a 81 y.o. female.  1940    Referring Physician:   Sriram Toth MD  [address], [place] [address]  [place] [id number]    Primary Care Provider:  Shaneka Gomez CRNP     Cancer Staging  No matching staging information was found for the patient.    Problem List:  Patient Active Problem List    Diagnosis Date Noted   • Malignant neoplasm of overlapping sites of right breast in female, estrogen receptor positive (CMS/[facility]) 01/01/2022       Chief Complaint    In breast recurrence of an early stage right breast cancer treated with partial mastectomy and whole breast radiation in approximately 1985          Subjective      History of Present Illness:    HPI     Ms. Kebede is an 81-year-old woman who states that she underwent right partial mastectomy, axillary lymph node dissection followed by 6-1/2 weeks of breast radiation in 1985 for an early stage right breast cancer.  She states that she did not receive adjuvant chemotherapy or antiestrogen therapy.  Records are not currently available.  She has been followed mammographically since that time with no evidence of breast recurrence.  She did develop chronic lymphedema and recurrent episodes of cellulitis of the right arm treated with physical therapy, Jobst stocking and approximately 6 hospitalizations for right arm cellulitis treated with IV antibiotics, most recently in 2017.  In September 2019, she underwent bilateral mammogram which showed no no abnormalities in the right breast apart from postoperative changes and a questionable area of fibrocystic change in the left breast.  Short-term follow-up views and ultrasound of the left breast obtained in June 2020 showed stable changes with no suspicious areas.  The patient underwent bilateral mammogram and targeted bilateral ultrasounds on November 22 of this year.  This showed a benign cyst at the 6 o'clock position of the left breast and a 6 x 6 x 6 mm irregular  "hypoechoic mass at the 12 o'clock position of the right breast 1 cm from the nipple adjacent to the lumpectomy scar.  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  The patient is referred for recommendations concerning for local therapy.  She also has an appointment with Dr. Solis later today to discuss systemic therapy.    Review of Systems:  Review of Systems - Oncology     Review of Systems   Constitutional: Negative.    HENT:  Negative.    Eyes: Negative for eye problems (wears contacts).   Respiratory: Negative.    Cardiovascular: Positive for leg swelling (right leg from fall - had hematoma). Negative for palpitations (No pacemaker).   Gastrointestinal: Negative.    Musculoskeletal: Positive for arthralgias (Right side from fall 12/15/21   pain 4/10.  Pt never had CXR to r/o fx.) and myalgias.   Skin: Negative.    Neurological: Negative for dizziness, extremity weakness, headaches and light-headedness.   Psychiatric/Behavioral: Negative.    Lymphedema controlled as / pt.  6 times in the hospital with cellultis to right arm.  Takes antibiotic prn if \"get a cut\"  Hx of SCCA with Mohs sx  Pt works Full time in a [employer].  Pt with HTN - suggested seeing Dr. Young's     Past Medical/Surgical History  Past Medical History:   Diagnosis Date   • Breast cancer (CMS/HCC) 1986    Right breast   • Breast cancer (CMS/HCC) 2021    Right breast   • History of radiation therapy 1986    Right breast.  Dr. Tai   • Lipid disorder    • Lymphedema    • Osteopenia    • Skin cancer     Nose - Mohs    SCCA     Past Surgical History:   Procedure Laterality Date   • ADENOIDECTOMY     • BREAST BIOPSY Right 2021   • BREAST LUMPECTOMY Right 1986   • CHOLECYSTECTOMY     • COLONOSCOPY     • FRACTURE SURGERY Left     wrist   • MOHS SURGERY      Nose for SCCA "   • SKIN BIOPSY     • TONSILLECTOMY          Current Medications  Current Outpatient Medications   Medication Sig Dispense Refill   • acetaminophen 500 mg tablet Take 500 mg by mouth every 6 (six) hours as needed for mild pain.     • calcium carbonate 200 mg calcium (500 mg) chewable tablet Take 1 tablet by mouth as needed for indigestion or heartburn.     • cephalexin 500 mg capsule Take 500 mg by mouth 4 (four) times a day.     • ibuprofen 200 mg tablet Take 200 mg by mouth every 6 (six) hours as needed for mild pain.     • multivitamin tablet Take 1 tablet by mouth daily.     • NOT IN DATABASE [employer] cream       No current facility-administered medications for this visit.       Allergies  No Known Allergies       Her only history of previous radiation therapy was to the right breast as above.    Her gynecologic past medical history is remarkable for menarche at 12 years of age.  She is to G3, P1 SAB 2 with her child born when she was 30.  She underwent menopause in 1985.  She has no history of exogenous hormone use.        Social History  Social History     Socioeconomic History   • Marital status:      Spouse name: Not on file   • Number of children: Not on file   • Years of education: Not on file   • Highest education level: Not on file   Occupational History   • Not on file   Tobacco Use   • Smoking status: Never Smoker   • Smokeless tobacco: Never Used   Substance and Sexual Activity   • Alcohol use: Yes     Alcohol/week: 2.0 standard drinks     Types: 2 Glasses of wine per week   • Drug use: Defer   • Sexual activity: Defer   Other Topics Concern   • Not on file   Social History Narrative   • Not on file     Social Determinants of Health     Financial Resource Strain: Not on file   Food Insecurity: Not on file   Transportation Needs: Not on file   Physical Activity: Not on file   Stress: Not on file   Social Connections: Not on file   Intimate Partner Violence: Not on file   Housing Stability: Not  on file       Family History  Family History   Problem Relation Age of Onset   • Liver cancer Biological Mother    • Heart disease Biological Mother    • Kidney disease Biological Mother    • Hypertension Biological Father    • Heart disease Biological Father    • Throat cancer Biological Sister    • Heart disease Biological Brother    • No Known Problems Maternal Grandmother    • No Known Problems Maternal Grandfather    • No Known Problems Paternal Grandmother    • No Known Problems Paternal Grandfather       Family Status   Relation Name Status   • Bio Mother  Other        squamous cell liver   • Bio Father  (Not Specified)   • Bio Sister  (Not Specified)   • Bio Brother  (Not Specified)   • MGM  (Not Specified)   • MGF  (Not Specified)   • PGM  (Not Specified)   • PGF  (Not Specified)   • Other Self Other        Right breast 1986               Objective      Physical Exam:  Vital Signs for this encounter:  BP: 164/92  Temp: 36.6 °C (97.8 °F)  Heart Rate: 72  SpO2: 98 %  Height: 152.4 cm (5')  Weight: 58.9 kg (129 lb 12.8 oz) (01/03 1100)    Physical Exam     She is a well-developed, well-nourished woman in no acute distress.  On HEENT exam, sclera are nonicteric.  Pupils are equal, round and react to light.  Visual fields are full.  Extraocular movements are intact.  There is no cervical, supraclavicular or axillary adenopathy palpated.  There is a well-healed surgical scar in the right axilla in addition to a well-healed surgical scar at the superior border of the right nipple areolar complex.  There is a small ecchymosis just superior to the lumpectomy scar at the site of the recent biopsy.  There is moderate fibrosis of the right breast and the right breast is moderately smaller than the left breast.  There are no dominant masses palpated in either breast.  There are small tattoos present on the anterior right chest wall consistent with previous whole breast radiation therapy.  Lungs are clear to percussion  and auscultation.  There is no spinal or CVA tenderness.  Cardiac rhythm is regular.  Abdomen is soft and nontender with normal bowel sounds.  There is no hepatosplenomegaly and no masses are palpated.  There is moderate lymphedema of the right upper extremity.  Neurologic exam is nonfocal.  Performance Status: KPS 90     PAIN ASSESSMENT:  Score Four    Location CHEST    Pain Intervention      LABS:  Recent Results (from the past 1008 hour(s))   Pathology Tissue Exam    Collection Time: 12/06/21 11:06 AM    Specimen: Breast, Right; Tissue   Result Value Ref Range Status    Case Report  No results found Final     Surgical Pathology                                Case: [id number]                                  Authorizing Provider:  Sriram Toth MD        Collected:           12/06/2021 1106              Ordering Location:     [facility]  Received:            12/06/2021 1115              Pathologist:           Satish Haro MD                                                       Specimen:    Breast, Right, right breast US bx - mass 12:00 1 cmfn (bowtie clip)                        Addendum  No results found Final     IMMUNOHISTOCHEMICAL ANALYSIS FOR ESTROGEN, PROGESTERONE, HER2 RECEPTORS AND Ki-67 IN INFILTRATING BREAST CARCINOMA      Immunohistochemical stains are performed on block A1    ESTROGEN RECEPTOR (CLONE SP1):    Positive. Nuclear staining is seen in 100% of infiltrating tumor cells     Intensity of nuclear staining:  Strong       PROGESTERONE RECEPTOR (CLONE IE2):        Positive. Nuclear staining is seen in 100% of infiltrating tumor cells     Intensity of nuclear staining:  Strong    HER2 (CLONE 4B5)     NEGATIVE (+1)      Ki-67: 25 %    Less than 10%: Favorable.  10-20%: Borderline.  20% and higher: unfavorable.      COMMENT FOR ER/PgR, HER2 AND Ki-67:  Immunohistochemical stains are performed on formalin fixed, paraffin embedded tissue. Tissue is fixed in 10% neutral buffered  formalin not less than 6 hours and not more than 72 hours.  Positive and negative controls are adequate. Internal control is: positive  Cold Ischemia time is 2 minutes  Specimen is adequate for testing.  The detection system used is a [employer] Multimer kit.    DEFINITION OF POSITIVE RESULTS FOR ER AND PgR:  Nuclear staining with any intensity, involving equal or greater than 1% of infiltrating tumor cells, is considered positive.    DEFINITION OF POSITIVE AND NEGATIVE RESULTS FOR HER2:    Staining Pattern Score HER2   Interpretation     No Membrane staining   0   Negative   Faint, partial staining of the membrane, >10%of invasive tumor cells  1+   Negative   Weak complete staining of the membrane, >10%of invasive tumor cells  Circumferential membrane IHC staining that is intense  but within =/<10% of tumor cells can be considered 2+ equivocal 2+       Indeterminate   Intense complete staining of the membrane, >10%of invasive tumor cells 3+   Positive     Product name: PATHWAY - TM HER2 (Clone 4B5); : [employer] FDA approved No: [id number]; Approval date: 11/28/2000    Reference:  Jalil ROSARIO, Lidia OLIVO, Nicholas B, et al : ASCO/CAP HER2 testing in breast cancer update.  Arch Pathol Lab Med 2409472;(9): doi:10.5858/arpa.4205-7775-OA  Lidia GEORGE, [place] SUGGS, Alyssa M, et al: ASCO/CAP guideline recommendations for Immunohistochemical testing of estrogen and progesterone receptors in breast cancer. J Clin Oncol 28:2784-70700, 2010.      The immunohistochemical test for Ki-67, estrogen and progesterone receptors used in this lab was developed and its performance characteristics determined by [employer].  It has not been cleared or approved by the U.S. Food and Drug Administration.  The FDA has determined that such clearance or approval is not necessary.  This test is used for clinical purposes. Immunohistochemical stains were performed at [facility], [address]  [address]. Jaskaran Guajardo MD, Medical Director.        Clinical Information  No results found Final     right breast US bx - mass 12:00 1 cmfn (bowtie clip)  [facility]ar     Final Diagnosis  No results found Final     A.  Right breast mass, 12:00, 1cm from nipple, biopsy (bowtie clip):    Invasive ductal carcinoma.     - Size: at least 8mm in the core biopsy.     - Histologic grade 2.     - Lymphatic invasion present.          **ATTENTION PATIENTS**  **The findings in this report have been made available for review potentially before your provider has had a chance to review and discuss the results with you.  Please allow time for your provider to review your results.  If you have any questions or concerns about these results, please contact the healthcare provider who ordered the test.**        Comment  No results found Final     The results were communicated to Dr. Toth's office on 12/7/21 at 12:53pm.      Gross Description  No results found Final     Cold Ischemia time: 2m 41s    The specimen is received in formalin labeled with the patient's name and \"right breast US bx - mass 12:00 1 cmfn (bowtie clip), breast, right\".  It consists of a 0.6 cc aggregate of yellow-white fibrofatty tissue fragments.  The specimen is submitted in toto in cassette A1.      NELL Baker (ASCP)cm            Assessment/Plan      In summary, Ms. Kebede is an 81-year-old woman with history of right breast cancer treated in approximately 1985 with partial mastectomy, axillary lymph node dissection and whole breast radiation therapy who has recently been diagnosed with an in breast recurrence versus new primary immediately adjacent to the previous lumpectomy site.  The area of recurrence measures 8 mm in greatest linear extent on biopsy and is strongly ER and NJ positive, HER-2 negative.  I do not think that the patient would be a good candidate for reirradiation of the right breast (external beam or  partial " breast irradiation) as the recurrence appears to be at the site of the primary tumor, which presumably received approximately 6400 to 6600 cGy.  The patient does have moderate fibrosis of the right breast in addition to chronic lymphedema of the right arm with recurrent episodes of cellulitis, and I think that potential risks of reirradiation outweigh benefits in this case.  She wishes to avoid mastectomy, which I think is not unreasonable as long as negative surgical margins can be obtained at the time of lumpectomy.  As her tumor is strongly ER and NM positive, she would be a good candidate for hormonal therapy.  I did tell her that if she developed a second in breast recurrence following lumpectomy and antiadjuvant therapy, she would need to undergo mastectomy at that time.    Diagnoses and Orders Associated With This Visit:  Malignant neoplasm of overlapping sites of right breast in female, estrogen receptor positive (CMS/HCC) (Primary)

## 2022-02-01 ENCOUNTER — APPOINTMENT (OUTPATIENT)
Dept: PREADMISSION TESTING | Facility: HOSPITAL | Age: 82
End: 2022-02-01
Payer: COMMERCIAL

## 2022-02-01 VITALS — BODY MASS INDEX: 24.94 KG/M2 | WEIGHT: 127 LBS | HEIGHT: 60 IN

## 2022-02-01 NOTE — PRE-PROCEDURE INSTRUCTIONS
1. We will call you between 3 pm and 5pm on 2/14/22 to inform you of arrival time for your procedure. If you do not hear by 6PM, please call [phone] for arrival time.     2. Please arrive through the Main Entrance of the [facility] (across from the parking garage) and report to the Surgery Registration Desk on the day of your procedure.    3. Please follow the following fasting guidelines:     No solid food EIGHT HOURS prior to surgery.  Unlimited clear liquids, meaning water or PLAIN black coffee WITHOUT any milk, cream, sugar, or sweetener are permitted up to TWO HOURS prior to arrival at the hospital.    4. Early on the morning of the procedure please take your usual dose of the listed medications with a sip of water:  None      5. Other Instructions: You may brush your teeth the morning of the procedure. Rinse and spit, do not swallow.  Bring a list of your medications with dosages.  Use surgical wash as directed.     6. If you develop a cold, cough, fever, rash, or other symptom prior to the day of the procedure, please report it to your physician immediately.    7. If you need to cancel the procedure for any reason, please contact your physician.    8. Make arrangements to have someone drive you home from the procedure. If you have not arranged for transportation home, your surgery may be cancelled.     9. You may not take public transportation unless accompanied by a responsible person.    10. You may not drive a car or operate complex or potentially dangerous machinery for 24 hours following anesthesia and/or sedation.    11.  If it is medically necessary for you to have a longer stay, you will be informed as soon as the decision is made.    12. Only bring essential items to the hospital.  Do not wear or bring anything of value to the hospital including jewelry of any kind, money, or wallet. Do not wear make-up or contact lenses.  DO NOT BRING MEDICATIONS FROM HOME unless instructed to do so. DO bring your  hearing aids, glasses, and a case    13. No lotion, creams, powders, or oils on skin the morning of procedure     14. Dress in comfortable clothes.    15.  If instructed, please bring a copy of your Advanced Directive (Living Will/Durable Power of ) on the day of your procedure.     16. Patients need to quarantine from the time of PAT COVID test to day of surgery, regardless of COVID vaccine status.      17. Ensuring your safety at all times is a very important part of our [facility] Culture of Safety. After having surgery and sedation, you are at risk for falling and balance issues. Although you may feel awake, the effects of the medication can last up to 24 hours after anesthesia. If you need to use the bathroom during your recovery period, nursing staff will escort you there and stay with you to ensure your safety.    18. Refrain from drinking alcohol and smoking cigarettes for 24 hours prior to surgery.    19. Shower with antibacterial soap (Dial) the night before and morning of your procedure.  If your procedure indicates the need for CHG antiseptic wash (Bactoshield or Hibiclens), please use this instead and follow instructions as discussed at the time of your Pre-Admission Testing phone interview or visit.    Above instructions reviewed with patient and patient acknowledges understanding.  Fasting guidelines repeated x3 and read back by patient.  Pt verbalized understanding of stated guidelines.       Form explained by: Bernadette Montesinos RN

## 2022-02-02 ENCOUNTER — ANESTHESIA EVENT (OUTPATIENT)
Dept: OPERATING ROOM | Facility: HOSPITAL | Age: 82
Setting detail: HOSPITAL OUTPATIENT SURGERY
End: 2022-02-02
Payer: COMMERCIAL

## 2022-02-08 ENCOUNTER — TRANSCRIBE ORDERS (OUTPATIENT)
Dept: REGISTRATION | Facility: HOSPITAL | Age: 82
End: 2022-02-08

## 2022-02-08 ENCOUNTER — APPOINTMENT (OUTPATIENT)
Dept: LAB | Facility: HOSPITAL | Age: 82
End: 2022-02-08
Attending: SURGERY
Payer: COMMERCIAL

## 2022-02-08 ENCOUNTER — HOSPITAL ENCOUNTER (OUTPATIENT)
Dept: CARDIOLOGY | Facility: HOSPITAL | Age: 82
Discharge: HOME | End: 2022-02-08
Attending: SURGERY
Payer: COMMERCIAL

## 2022-02-08 DIAGNOSIS — C50.911 MALIGNANT NEOPLASM OF UNSPECIFIED SITE OF RIGHT FEMALE BREAST (CMS/HCC): Primary | ICD-10-CM

## 2022-02-08 DIAGNOSIS — C50.911 MALIGNANT NEOPLASM OF UNSPECIFIED SITE OF RIGHT FEMALE BREAST (CMS/HCC): ICD-10-CM

## 2022-02-08 LAB
ANION GAP SERPL CALC-SCNC: 9 MEQ/L (ref 3–15)
ATRIAL RATE: 77
BUN SERPL-MCNC: 14 MG/DL (ref 8–20)
CALCIUM SERPL-MCNC: 9.3 MG/DL (ref 8.9–10.3)
CHLORIDE SERPL-SCNC: 105 MEQ/L (ref 98–109)
CO2 SERPL-SCNC: 27 MEQ/L (ref 22–32)
CREAT SERPL-MCNC: 0.7 MG/DL (ref 0.6–1.1)
ERYTHROCYTE [DISTWIDTH] IN BLOOD BY AUTOMATED COUNT: 13.9 % (ref 11.7–14.4)
GFR SERPL CREATININE-BSD FRML MDRD: >60 ML/MIN/1.73M*2
GLUCOSE SERPL-MCNC: 87 MG/DL (ref 70–99)
HCT VFR BLDCO AUTO: 43 % (ref 35–45)
HGB BLD-MCNC: 13.7 G/DL (ref 11.8–15.7)
MCH RBC QN AUTO: 30 PG (ref 28–33.2)
MCHC RBC AUTO-ENTMCNC: 31.9 G/DL (ref 32.2–35.5)
MCV RBC AUTO: 94.3 FL (ref 83–98)
P AXIS: 68
PDW BLD AUTO: 10.8 FL (ref 9.4–12.3)
PLATELET # BLD AUTO: 259 K/UL (ref 150–369)
POTASSIUM SERPL-SCNC: 4 MEQ/L (ref 3.6–5.1)
PR INTERVAL: 168
QRS DURATION: 84
QT INTERVAL: 380
QTC CALCULATION(BAZETT): 430
R AXIS: -29
RBC # BLD AUTO: 4.56 M/UL (ref 3.93–5.22)
SODIUM SERPL-SCNC: 141 MEQ/L (ref 136–144)
T WAVE AXIS: 73
VENTRICULAR RATE: 77
WBC # BLD AUTO: 7 K/UL (ref 3.8–10.5)

## 2022-02-08 PROCEDURE — 36415 COLL VENOUS BLD VENIPUNCTURE: CPT

## 2022-02-08 PROCEDURE — 93010 ELECTROCARDIOGRAM REPORT: CPT | Performed by: INTERNAL MEDICINE

## 2022-02-08 PROCEDURE — 85027 COMPLETE CBC AUTOMATED: CPT

## 2022-02-08 PROCEDURE — 80048 BASIC METABOLIC PNL TOTAL CA: CPT

## 2022-02-08 PROCEDURE — 93005 ELECTROCARDIOGRAM TRACING: CPT

## 2022-02-11 ENCOUNTER — PATIENT OUTREACH (OUTPATIENT)
Dept: RADIOLOGY | Facility: HOSPITAL | Age: 82
End: 2022-02-11
Payer: COMMERCIAL

## 2022-02-14 ENCOUNTER — TRANSCRIBE ORDERS (OUTPATIENT)
Dept: LAB | Age: 82
End: 2022-02-14

## 2022-02-14 ENCOUNTER — PATIENT OUTREACH (OUTPATIENT)
Dept: RADIOLOGY | Facility: HOSPITAL | Age: 82
End: 2022-02-14
Payer: COMMERCIAL

## 2022-02-14 ENCOUNTER — APPOINTMENT (OUTPATIENT)
Dept: LAB | Age: 82
End: 2022-02-14
Attending: SURGERY
Payer: COMMERCIAL

## 2022-02-14 DIAGNOSIS — C50.911 MALIGNANT NEOPLASM OF UNSPECIFIED SITE OF RIGHT FEMALE BREAST (CMS/HCC): ICD-10-CM

## 2022-02-14 DIAGNOSIS — C50.911 MALIGNANT NEOPLASM OF UNSPECIFIED SITE OF RIGHT FEMALE BREAST (CMS/HCC): Primary | ICD-10-CM

## 2022-02-14 LAB — SARS-COV-2 RNA RESP QL NAA+PROBE: NEGATIVE

## 2022-02-14 PROCEDURE — C9803 HOPD COVID-19 SPEC COLLECT: HCPCS

## 2022-02-14 PROCEDURE — U0003 INFECTIOUS AGENT DETECTION BY NUCLEIC ACID (DNA OR RNA); SEVERE ACUTE RESPIRATORY SYNDROME CORONAVIRUS 2 (SARS-COV-2) (CORONAVIRUS DISEASE [COVID-19]), AMPLIFIED PROBE TECHNIQUE, MAKING USE OF HIGH THROUGHPUT TECHNOLOGIES AS DESCRIBED BY CMS-2020-01-R: HCPCS

## 2022-02-15 ENCOUNTER — HOSPITAL ENCOUNTER (OUTPATIENT)
Facility: HOSPITAL | Age: 82
Setting detail: HOSPITAL OUTPATIENT SURGERY
Discharge: HOME | End: 2022-02-15
Attending: SURGERY | Admitting: SURGERY
Payer: COMMERCIAL

## 2022-02-15 ENCOUNTER — HOSPITAL ENCOUNTER (OUTPATIENT)
Dept: RADIOLOGY | Facility: HOSPITAL | Age: 82
Setting detail: HOSPITAL OUTPATIENT SURGERY
Discharge: HOME | End: 2022-02-15
Attending: SURGERY
Payer: COMMERCIAL

## 2022-02-15 ENCOUNTER — ANESTHESIA (OUTPATIENT)
Dept: OPERATING ROOM | Facility: HOSPITAL | Age: 82
Setting detail: HOSPITAL OUTPATIENT SURGERY
End: 2022-02-15
Payer: COMMERCIAL

## 2022-02-15 ENCOUNTER — PATIENT OUTREACH (OUTPATIENT)
Dept: RADIOLOGY | Facility: HOSPITAL | Age: 82
End: 2022-02-15
Payer: COMMERCIAL

## 2022-02-15 VITALS
TEMPERATURE: 96.9 F | WEIGHT: 128 LBS | BODY MASS INDEX: 25.13 KG/M2 | HEART RATE: 76 BPM | SYSTOLIC BLOOD PRESSURE: 121 MMHG | HEIGHT: 60 IN | DIASTOLIC BLOOD PRESSURE: 64 MMHG | RESPIRATION RATE: 16 BRPM | OXYGEN SATURATION: 94 %

## 2022-02-15 DIAGNOSIS — R92.8 ABNORMAL MAMMOGRAM: ICD-10-CM

## 2022-02-15 DIAGNOSIS — C50.911 MALIGNANT NEOPLASM OF RIGHT FEMALE BREAST, UNSPECIFIED ESTROGEN RECEPTOR STATUS, UNSPECIFIED SITE OF BREAST (CMS/HCC): ICD-10-CM

## 2022-02-15 PROCEDURE — 37000001 HC ANESTHESIA GENERAL: Performed by: SURGERY

## 2022-02-15 PROCEDURE — 27200000 HC STERILE SUPPLY: Performed by: SURGERY

## 2022-02-15 PROCEDURE — 25000000 HC PHARMACY GENERAL: Performed by: SURGERY

## 2022-02-15 PROCEDURE — 88307 TISSUE EXAM BY PATHOLOGIST: CPT | Performed by: SURGERY

## 2022-02-15 PROCEDURE — BH00ZZZ PLAIN RADIOGRAPHY OF RIGHT BREAST: ICD-10-PCS | Performed by: RADIOLOGY

## 2022-02-15 PROCEDURE — 36000003 HC OR LEVEL 3 INITIAL 30MIN: Performed by: SURGERY

## 2022-02-15 PROCEDURE — 36000013 HC OR LEVEL 3 EA ADDL MIN: Performed by: SURGERY

## 2022-02-15 PROCEDURE — 71000002 HC PACU PHASE 2 INITIAL 30MIN: Performed by: SURGERY

## 2022-02-15 PROCEDURE — 63600000 HC DRUGS/DETAIL CODE: Performed by: NURSE ANESTHETIST, CERTIFIED REGISTERED

## 2022-02-15 PROCEDURE — 71000011 HC PACU PHASE 1 EA ADDL MIN: Performed by: SURGERY

## 2022-02-15 PROCEDURE — 0HBT0ZZ EXCISION OF RIGHT BREAST, OPEN APPROACH: ICD-10-PCS | Performed by: SURGERY

## 2022-02-15 PROCEDURE — 36100330 BI NEEDLE LOCALIZATION RIGHT

## 2022-02-15 PROCEDURE — 71000001 HC PACU PHASE 1 INITIAL 30MIN: Performed by: SURGERY

## 2022-02-15 PROCEDURE — 71000012 HC PACU PHASE 2 EA ADDL MIN: Performed by: SURGERY

## 2022-02-15 PROCEDURE — 25800000 HC PHARMACY IV SOLUTIONS: Performed by: SURGERY

## 2022-02-15 PROCEDURE — 25000000 HC PHARMACY GENERAL: Performed by: NURSE ANESTHETIST, CERTIFIED REGISTERED

## 2022-02-15 RX ORDER — FENTANYL CITRATE 50 UG/ML
INJECTION, SOLUTION INTRAMUSCULAR; INTRAVENOUS AS NEEDED
Status: DISCONTINUED | OUTPATIENT
Start: 2022-02-15 | End: 2022-02-15 | Stop reason: SURG

## 2022-02-15 RX ORDER — SODIUM CHLORIDE 9 MG/ML
INJECTION, SOLUTION INTRAVENOUS CONTINUOUS
Status: DISCONTINUED | OUTPATIENT
Start: 2022-02-15 | End: 2022-02-15 | Stop reason: HOSPADM

## 2022-02-15 RX ORDER — ONDANSETRON HYDROCHLORIDE 2 MG/ML
INJECTION, SOLUTION INTRAVENOUS AS NEEDED
Status: DISCONTINUED | OUTPATIENT
Start: 2022-02-15 | End: 2022-02-15 | Stop reason: SURG

## 2022-02-15 RX ORDER — PROPOFOL 10 MG/ML
INJECTION, EMULSION INTRAVENOUS AS NEEDED
Status: DISCONTINUED | OUTPATIENT
Start: 2022-02-15 | End: 2022-02-15 | Stop reason: SURG

## 2022-02-15 RX ORDER — LIDOCAINE HYDROCHLORIDE 10 MG/ML
5 INJECTION, SOLUTION EPIDURAL; INFILTRATION; INTRACAUDAL; PERINEURAL ONCE
Status: COMPLETED | OUTPATIENT
Start: 2022-02-15 | End: 2022-02-15

## 2022-02-15 RX ORDER — LIDOCAINE HYDROCHLORIDE 10 MG/ML
INJECTION, SOLUTION EPIDURAL; INFILTRATION; INTRACAUDAL; PERINEURAL AS NEEDED
Status: DISCONTINUED | OUTPATIENT
Start: 2022-02-15 | End: 2022-02-15 | Stop reason: SURG

## 2022-02-15 RX ORDER — DEXAMETHASONE SODIUM PHOSPHATE 4 MG/ML
INJECTION, SOLUTION INTRA-ARTICULAR; INTRALESIONAL; INTRAMUSCULAR; INTRAVENOUS; SOFT TISSUE AS NEEDED
Status: DISCONTINUED | OUTPATIENT
Start: 2022-02-15 | End: 2022-02-15 | Stop reason: SURG

## 2022-02-15 RX ORDER — LIDOCAINE HYDROCHLORIDE AND EPINEPHRINE 10; 10 UG/ML; MG/ML
INJECTION, SOLUTION INFILTRATION; PERINEURAL
Status: DISCONTINUED | OUTPATIENT
Start: 2022-02-15 | End: 2022-02-15 | Stop reason: HOSPADM

## 2022-02-15 RX ORDER — FENTANYL CITRATE 50 UG/ML
50 INJECTION, SOLUTION INTRAMUSCULAR; INTRAVENOUS
Status: DISCONTINUED | OUTPATIENT
Start: 2022-02-15 | End: 2022-02-15 | Stop reason: HOSPADM

## 2022-02-15 RX ORDER — HYDROMORPHONE HYDROCHLORIDE 1 MG/ML
0.5 INJECTION, SOLUTION INTRAMUSCULAR; INTRAVENOUS; SUBCUTANEOUS
Status: DISCONTINUED | OUTPATIENT
Start: 2022-02-15 | End: 2022-02-15 | Stop reason: HOSPADM

## 2022-02-15 RX ORDER — LIDOCAINE HYDROCHLORIDE AND EPINEPHRINE 10; 10 UG/ML; MG/ML
0-30 INJECTION, SOLUTION INFILTRATION; PERINEURAL ONCE
Status: SHIPPED | OUTPATIENT
Start: 2022-02-15 | End: 2022-02-15

## 2022-02-15 RX ORDER — ONDANSETRON HYDROCHLORIDE 2 MG/ML
4 INJECTION, SOLUTION INTRAVENOUS
Status: DISCONTINUED | OUTPATIENT
Start: 2022-02-15 | End: 2022-02-15 | Stop reason: HOSPADM

## 2022-02-15 RX ADMIN — PROPOFOL 50 MG: 10 INJECTION, EMULSION INTRAVENOUS at 09:55

## 2022-02-15 RX ADMIN — LIDOCAINE HYDROCHLORIDE 5 ML: 10 INJECTION, SOLUTION EPIDURAL; INFILTRATION; INTRACAUDAL; PERINEURAL at 08:33

## 2022-02-15 RX ADMIN — SODIUM CHLORIDE: 9 INJECTION, SOLUTION INTRAVENOUS at 09:44

## 2022-02-15 RX ADMIN — PROPOFOL 150 MG: 10 INJECTION, EMULSION INTRAVENOUS at 09:48

## 2022-02-15 RX ADMIN — FENTANYL CITRATE 50 MCG: 50 INJECTION INTRAMUSCULAR; INTRAVENOUS at 09:58

## 2022-02-15 RX ADMIN — LIDOCAINE HYDROCHLORIDE 5 ML: 10 INJECTION, SOLUTION EPIDURAL; INFILTRATION; INTRACAUDAL; PERINEURAL at 09:48

## 2022-02-15 RX ADMIN — FENTANYL CITRATE 50 MCG: 50 INJECTION INTRAMUSCULAR; INTRAVENOUS at 09:55

## 2022-02-15 RX ADMIN — ONDANSETRON 4 MG: 2 INJECTION INTRAMUSCULAR; INTRAVENOUS at 09:51

## 2022-02-15 RX ADMIN — DEXAMETHASONE SODIUM PHOSPHATE 4 MG: 4 INJECTION, SOLUTION INTRA-ARTICULAR; INTRALESIONAL; INTRAMUSCULAR; INTRAVENOUS; SOFT TISSUE at 09:51

## 2022-02-15 NOTE — ANESTHESIOLOGIST PRE-PROCEDURE ATTESTATION
Pre-Procedure Patient Identification:  I am the Primary Anesthesiologist and have identified the patient on 02/15/22 at 8:08 AM.   I have confirmed the procedure(s) will be performed by the following surgeon/proceduralist Sriram Toth MD.

## 2022-02-15 NOTE — DISCHARGE INSTRUCTIONS
You may remove her dressing after 48 hours and start showering.  Avoid any significant strenuous activity.  Make sure to call the office at [phone] for an appointment in 10-14 days.  If you have any significant questions feel free to call the office sooner.    You may use Tylenol or the equivalent for discomfort        General Anesthesia, Adult, Care After  This sheet gives you information about how to care for yourself after your procedure. Your health care provider may also give you more specific instructions. If you have problems or questions, contact your health care provider.  What can I expect after the procedure?  After the procedure, the following side effects are common:  · Pain or discomfort at the IV site.  · Nausea.  · Vomiting.  · Sore throat.  · Trouble concentrating.  · Feeling cold or chills.  · Weak or tired.  · Sleepiness and fatigue.  · Soreness and body aches. These side effects can affect parts of the body that were not involved in surgery.  Follow these instructions at home:    For at least 24 hours after the procedure:  · Have a responsible adult stay with you. It is important to have someone help care for you until you are awake and alert.  · Rest as needed.  · Do not:  ? Participate in activities in which you could fall or become injured.  ? Drive.  ? Use heavy machinery.  ? Drink alcohol.  ? Take sleeping pills or medicines that cause drowsiness.  ? Make important decisions or sign legal documents.  ? Take care of children on your own.  Eating and drinking  · Follow any instructions from your health care provider about eating or drinking restrictions.  · When you feel hungry, start by eating small amounts of foods that are soft and easy to digest (bland), such as toast. Gradually return to your regular diet.  · Drink enough fluid to keep your urine pale yellow.  · If you vomit, rehydrate by drinking water, juice, or clear broth.  General instructions  · If you have sleep apnea, surgery  and certain medicines can increase your risk for breathing problems. Follow instructions from your health care provider about wearing your sleep device:  ? Anytime you are sleeping, including during daytime naps.  ? While taking prescription pain medicines, sleeping medicines, or medicines that make you drowsy.  · Return to your normal activities as told by your health care provider. Ask your health care provider what activities are safe for you.  · Take over-the-counter and prescription medicines only as told by your health care provider.  · If you smoke, do not smoke without supervision.  · Keep all follow-up visits as told by your health care provider. This is important.  Contact a health care provider if:  · You have nausea or vomiting that does not get better with medicine.  · You cannot eat or drink without vomiting.  · You have pain that does not get better with medicine.  · You are unable to pass urine.  · You develop a skin rash.  · You have a fever.  · You have redness around your IV site that gets worse.  Get help right away if:  · You have difficulty breathing.  · You have chest pain.  · You have blood in your urine or stool, or you vomit blood.  Summary  · After the procedure, it is common to have a sore throat or nausea. It is also common to feel tired.  · Have a responsible adult stay with you for the first 24 hours after general anesthesia. It is important to have someone help care for you until you are awake and alert.  · When you feel hungry, start by eating small amounts of foods that are soft and easy to digest (bland), such as toast. Gradually return to your regular diet.  · Drink enough fluid to keep your urine pale yellow.  · Return to your normal activities as told by your health care provider. Ask your health care provider what activities are safe for you.  This information is not intended to replace advice given to you by your health care provider. Make sure you discuss any questions you  have with your health care provider.  Document Revised: 12/21/2018 Document Reviewed: 08/03/2018  Elsevier Patient Education © 2021 [employer]

## 2022-02-15 NOTE — OR SURGEON
Pre-Procedure patient identification:  I am the primary operating surgeon/proceduralist and I have identified the patient on 02/15/22 at 9:41 AM Sriram Toth MD  Phone Number: [phone]

## 2022-02-15 NOTE — ANESTHESIA POSTPROCEDURE EVALUATION
Patient: Liz Kebede    Procedure Summary     Date: 02/15/22 Room / Location:  OR 3 / PH OR    Anesthesia Start: 0944 Anesthesia Stop: 1030    Procedure: RIGHT BREAST LUMPECTOMY, WIRE LOCALIZATION (Right Breast) Diagnosis:       Malignant neoplasm of right female breast, unspecified estrogen receptor status, unspecified site of breast (CMS/HCC)      (Right Breast Cancer C50.911)    Surgeons: Sriram Toth MD Responsible Provider: Derik Olson MD    Anesthesia Type: general ASA Status: 2          Anesthesia Type: general  PACU Vitals  2/15/2022 1022 - 2/15/2022 1122      2/15/2022  1030 2/15/2022  1045 2/15/2022  1100 2/15/2022  1112    BP: 124/61 119/60 127/60 137/64    Temp: 36.7 °C (98.1 °F) -- 36.7 °C (98 °F) 36.1 °C (96.9 °F)    Pulse: 80 74 72 72    Resp: 18 16 18 18    SpO2: 96 % 95 % 95 % 94 %            Anesthesia Post Evaluation    Pain management: adequate  Patient participation: complete - patient participated  Level of consciousness: awake and alert  Cardiovascular status: acceptable  Airway Patency: adequate  Respiratory status: acceptable  Hydration status: acceptable  Anesthetic complications: no

## 2022-02-15 NOTE — OP NOTE
RIGHT BREAST LUMPECTOMY, WIRE LOCALIZATION (R) Procedure Note     Pre-opDx: Right breast cancer    Post op Dx: Right breast cancer    Operation: Right wire localization lumpectomy      Post-op Diagnosis     * Malignant neoplasm of right female breast, unspecified estrogen receptor status, unspecified site of breast (CMS/[facility]) [C50.911]    Surgeon: Sriram Toth MD     Assistants:     Anesthesia: General LMA anesthesia    ASA Class: 2      Procedure Details   This patient had a secondary breast cancer of the right side and was given a number of options.  She elected to this point in time to have a wide lumpectomy performed.  Right breast was marked then prepped and draped in a normal sterile fashion.  1% lidocaine infused.  Incision was made above her previous incision from her prior breast cancer.  A wide lumpectomy was performed an attempt to obtain negative margins.  This included the area has been marked by wire and intraoperative radiographic study showed the clip to be present in the center of the specimen.  In addition however there was would like a slight bruise and inflamed tissue at the very inferior deep margin of where the wire was.  I took that out as a separate specimen and marked most of these for orientation.  Good hemostasis obtained and the wound was closed in 2 layers.    Findings:  There were no unusual findings    Estimated Blood Loss:  No blood loss documented.           Specimens:   ID Type Source Tests Collected by Time Destination   1 : right lumpectomy. long stitch lateral, short stitch superior Tissue Breast, Right PATHOLOGY TISSUE EXAM Sriram Toth MD 2/15/2022 1002    2 : deep inferior tissue. stitch marks new deep margin Tissue Breast, Right PATHOLOGY TISSUE EXAM Sriram Toth MD 2/15/2022 1004               Implants: * No implants in log *           Complications: None           Disposition: PACU - hemodynamically stable.           Condition: stable    Sriram Toth  MD  Phone Number: [phone]    Sriram Toth MD  2/15/2022  10:21 AM

## 2022-02-15 NOTE — ANESTHESIA PREPROCEDURE EVALUATION
Relevant Problems   Other   (+) Malignant neoplasm of overlapping sites of right breast in female, estrogen receptor positive (CMS/HCC)       Anesthesia ROS/MED HX    Anesthesia History    Previous anesthetics  No family history of anesthetic complications  No history of anesthetic complications  Endo/Other  History of cancer       Past Surgical History:   Procedure Laterality Date   • ADENOIDECTOMY     • BREAST BIOPSY Right 2021   • BREAST LUMPECTOMY Right 1986   • CHOLECYSTECTOMY     • COLONOSCOPY     • FRACTURE SURGERY Left     wrist   • MOHS SURGERY      Nose for SCCA   • SKIN BIOPSY     • TONSILLECTOMY         Physical Exam    Airway   Mallampati: II   TM distance: >3 FB   Neck ROM: full  Dental - normal        Anesthesia Plan    Plan: general    Technique: general LMA   ASA 2  Anesthetic plan and risks discussed with: patient

## 2022-02-15 NOTE — ANESTHESIA PROCEDURE NOTES
Airway  Urgency: elective    Start Time: 2/15/2022 9:50 AM  Airway not difficult    General Information and Staff    Patient location during procedure: OR  Anesthesiologist: Derik Olson MD  Resident/CRNA: Lizbeth Piedra CRNA  Performed: resident/CRNA     Indications and Patient Condition  Indications for airway management: anesthesia  Sedation level: deep  Preoxygenated: yes  Patient position: sniffing  Mask difficulty assessment: 1 - vent by mask    Final Airway Details  Final airway type: supraglottic airway      Successful airway: iGel  Size 4    Number of attempts at approach: 1  Number of other approaches attempted: 0  Atraumatic airway insertion

## 2022-02-16 ENCOUNTER — PATIENT OUTREACH (OUTPATIENT)
Dept: RADIOLOGY | Facility: HOSPITAL | Age: 82
End: 2022-02-16
Payer: COMMERCIAL

## 2022-02-17 LAB
CASE RPRT: NORMAL
CLINICAL INFO: NORMAL
IMMUNE STAIN STUDY: NORMAL
PATH REPORT.FINAL DX SPEC: NORMAL
PATH REPORT.FINAL DX SPEC: NORMAL
PATH REPORT.GROSS SPEC: NORMAL
PATHOLOGY SYNOPTIC REPORT: NORMAL

## 2022-02-22 PROCEDURE — 88341 IMHCHEM/IMCYTCHM EA ADD ANTB: CPT | Performed by: SURGERY

## 2022-02-23 ENCOUNTER — LAB REQUISITION (OUTPATIENT)
Dept: LAB | Facility: HOSPITAL | Age: 82
End: 2022-02-23
Payer: COMMERCIAL

## 2022-02-23 DIAGNOSIS — C50.111 MALIGNANT NEOPLASM OF CENTRAL PORTION OF RIGHT FEMALE BREAST (CMS/HCC): ICD-10-CM

## 2022-02-25 LAB
CASE RPRT: NORMAL
CLINICAL INFO: NORMAL
IMMUNE STAIN STUDY: NORMAL
PATH REPORT.FINAL DX SPEC: NORMAL
PATH REPORT.GROSS SPEC: NORMAL

## 2022-07-25 ENCOUNTER — APPOINTMENT (OUTPATIENT)
Dept: URBAN - METROPOLITAN AREA CLINIC 203 | Age: 82
Setting detail: DERMATOLOGY
End: 2022-07-26

## 2022-07-25 DIAGNOSIS — L57.8 OTHER SKIN CHANGES DUE TO CHRONIC EXPOSURE TO NONIONIZING RADIATION: ICD-10-CM

## 2022-07-25 DIAGNOSIS — L57.0 ACTINIC KERATOSIS: ICD-10-CM

## 2022-07-25 DIAGNOSIS — L82.1 OTHER SEBORRHEIC KERATOSIS: ICD-10-CM

## 2022-07-25 DIAGNOSIS — Z85.828 PERSONAL HISTORY OF OTHER MALIGNANT NEOPLASM OF SKIN: ICD-10-CM

## 2022-07-25 DIAGNOSIS — Z11.52 ENCOUNTER FOR SCREENING FOR COVID-19: ICD-10-CM

## 2022-07-25 PROCEDURE — OTHER COUNSELING: OTHER

## 2022-07-25 PROCEDURE — OTHER PRESCRIPTION MEDICATION MANAGEMENT: OTHER

## 2022-07-25 PROCEDURE — 99213 OFFICE O/P EST LOW 20 MIN: CPT | Mod: 25

## 2022-07-25 PROCEDURE — OTHER REASSURANCE: OTHER

## 2022-07-25 PROCEDURE — OTHER SUNSCREEN RECOMMENDATIONS: OTHER

## 2022-07-25 PROCEDURE — 17000 DESTRUCT PREMALG LESION: CPT

## 2022-07-25 PROCEDURE — OTHER LIQUID NITROGEN: OTHER

## 2022-07-25 PROCEDURE — OTHER SCREENING FOR COVID-19: OTHER

## 2022-07-25 ASSESSMENT — LOCATION ZONE DERM
LOCATION ZONE: ARM
LOCATION ZONE: LEG
LOCATION ZONE: TRUNK

## 2022-07-25 ASSESSMENT — LOCATION DETAILED DESCRIPTION DERM
LOCATION DETAILED: LEFT VENTRAL PROXIMAL FOREARM
LOCATION DETAILED: LEFT MEDIAL BREAST 11-12:00 REGION
LOCATION DETAILED: PERIUMBILICAL SKIN
LOCATION DETAILED: EPIGASTRIC SKIN
LOCATION DETAILED: RIGHT CLAVICULAR SKIN
LOCATION DETAILED: LEFT MEDIAL UPPER BACK
LOCATION DETAILED: LEFT ANTERIOR PROXIMAL THIGH
LOCATION DETAILED: LEFT MEDIAL BREAST 10-11:00 REGION

## 2022-07-25 ASSESSMENT — LOCATION SIMPLE DESCRIPTION DERM
LOCATION SIMPLE: LEFT UPPER BACK
LOCATION SIMPLE: LEFT FOREARM
LOCATION SIMPLE: RIGHT CLAVICULAR SKIN
LOCATION SIMPLE: ABDOMEN
LOCATION SIMPLE: LEFT BREAST
LOCATION SIMPLE: LEFT THIGH

## 2022-07-25 ASSESSMENT — PAIN INTENSITY VAS: HOW INTENSE IS YOUR PAIN 0 BEING NO PAIN, 10 BEING THE MOST SEVERE PAIN POSSIBLE?: NO PAIN

## 2023-01-30 ENCOUNTER — APPOINTMENT (OUTPATIENT)
Dept: URBAN - METROPOLITAN AREA CLINIC 203 | Age: 83
Setting detail: DERMATOLOGY
End: 2023-01-30

## 2023-01-30 DIAGNOSIS — Z85.828 PERSONAL HISTORY OF OTHER MALIGNANT NEOPLASM OF SKIN: ICD-10-CM

## 2023-01-30 DIAGNOSIS — L82.1 OTHER SEBORRHEIC KERATOSIS: ICD-10-CM

## 2023-01-30 DIAGNOSIS — L57.8 OTHER SKIN CHANGES DUE TO CHRONIC EXPOSURE TO NONIONIZING RADIATION: ICD-10-CM

## 2023-01-30 DIAGNOSIS — L57.0 ACTINIC KERATOSIS: ICD-10-CM

## 2023-01-30 PROCEDURE — OTHER COUNSELING: OTHER

## 2023-01-30 PROCEDURE — 99213 OFFICE O/P EST LOW 20 MIN: CPT | Mod: 25

## 2023-01-30 PROCEDURE — 17000 DESTRUCT PREMALG LESION: CPT

## 2023-01-30 PROCEDURE — OTHER PRESCRIPTION MEDICATION MANAGEMENT: OTHER

## 2023-01-30 PROCEDURE — OTHER LIQUID NITROGEN: OTHER

## 2023-01-30 PROCEDURE — OTHER SUNSCREEN RECOMMENDATIONS: OTHER

## 2023-01-30 PROCEDURE — OTHER PRESCRIPTION: OTHER

## 2023-01-30 RX ORDER — HYDROCORTISONE 25 MG/G
CREAM TOPICAL
Qty: 30 | Refills: 3 | Status: ERX | COMMUNITY
Start: 2023-01-30

## 2023-01-30 ASSESSMENT — LOCATION DETAILED DESCRIPTION DERM
LOCATION DETAILED: LEFT MEDIAL BREAST 11-12:00 REGION
LOCATION DETAILED: LEFT MEDIAL BREAST 10-11:00 REGION
LOCATION DETAILED: EPIGASTRIC SKIN
LOCATION DETAILED: LEFT LATERAL BUCCAL CHEEK
LOCATION DETAILED: PERIUMBILICAL SKIN
LOCATION DETAILED: RIGHT SUPERIOR HELIX
LOCATION DETAILED: SUPERIOR LUMBAR SPINE

## 2023-01-30 ASSESSMENT — LOCATION SIMPLE DESCRIPTION DERM
LOCATION SIMPLE: RIGHT EAR
LOCATION SIMPLE: LEFT BREAST
LOCATION SIMPLE: ABDOMEN
LOCATION SIMPLE: LEFT CHEEK
LOCATION SIMPLE: LOWER BACK

## 2023-01-30 ASSESSMENT — LOCATION ZONE DERM
LOCATION ZONE: EAR
LOCATION ZONE: TRUNK
LOCATION ZONE: FACE

## 2023-01-30 ASSESSMENT — PAIN INTENSITY VAS: HOW INTENSE IS YOUR PAIN 0 BEING NO PAIN, 10 BEING THE MOST SEVERE PAIN POSSIBLE?: NO PAIN

## 2023-01-30 NOTE — PROCEDURE: LIQUID NITROGEN
Application Tool (Optional): Liquid Nitrogen Sprayer
Render Note In Bullet Format When Appropriate: No
Duration Of Freeze Thaw-Cycle (Seconds): 0
Show Applicator Variable?: Yes
Detail Level: Detailed
Consent: The patient's consent was obtained including but not limited to risks of crusting, scabbing, blistering, scarring, darker or lighter pigmentary change, recurrence, incomplete removal and infection.
Post-Care Instructions: I reviewed with the patient in detail post-care instructions. Patient is to wear sunprotection, and avoid picking at any of the treated lesions. Pt may apply Vaseline to crusted or scabbing areas.

## 2023-01-30 NOTE — PROCEDURE: PRESCRIPTION MEDICATION MANAGEMENT
Initiate Treatment: Hydrocortisone cream\\nClaratin
Render In Strict Bullet Format?: No
Detail Level: Zone

## 2023-02-21 ENCOUNTER — TRANSCRIBE ORDERS (OUTPATIENT)
Dept: SCHEDULING | Age: 83
End: 2023-02-21

## 2023-02-21 DIAGNOSIS — Z85.3 PERSONAL HISTORY OF MALIGNANT NEOPLASM OF BREAST: Primary | ICD-10-CM

## 2023-03-13 ENCOUNTER — HOSPITAL ENCOUNTER (OUTPATIENT)
Dept: RADIOLOGY | Facility: HOSPITAL | Age: 83
Discharge: HOME | End: 2023-03-13
Attending: SURGERY
Payer: COMMERCIAL

## 2023-03-13 DIAGNOSIS — Z85.3 PERSONAL HISTORY OF MALIGNANT NEOPLASM OF BREAST: ICD-10-CM

## 2023-03-13 PROCEDURE — 77066 DX MAMMO INCL CAD BI: CPT

## 2023-08-24 ENCOUNTER — TRANSCRIBE ORDERS (OUTPATIENT)
Dept: SCHEDULING | Age: 83
End: 2023-08-24

## 2023-08-24 DIAGNOSIS — Z85.3 PERSONAL HISTORY OF MALIGNANT NEOPLASM OF BREAST: Primary | ICD-10-CM

## 2023-09-11 ENCOUNTER — TRANSCRIBE ORDERS (OUTPATIENT)
Dept: SCHEDULING | Age: 83
End: 2023-09-11

## 2023-09-11 DIAGNOSIS — Z78.0 ASYMPTOMATIC MENOPAUSAL STATE: Primary | ICD-10-CM

## 2023-09-14 ENCOUNTER — HOSPITAL ENCOUNTER (OUTPATIENT)
Dept: RADIOLOGY | Facility: HOSPITAL | Age: 83
Discharge: HOME | End: 2023-09-14
Attending: SURGERY
Payer: COMMERCIAL

## 2023-09-14 DIAGNOSIS — Z85.3 PERSONAL HISTORY OF MALIGNANT NEOPLASM OF BREAST: ICD-10-CM

## 2023-09-14 PROCEDURE — G0279 TOMOSYNTHESIS, MAMMO: HCPCS | Mod: RT

## 2023-09-14 PROCEDURE — 77065 DX MAMMO INCL CAD UNI: CPT | Mod: RT

## 2023-09-28 ENCOUNTER — APPOINTMENT (OUTPATIENT)
Dept: URBAN - METROPOLITAN AREA CLINIC 203 | Age: 83
Setting detail: DERMATOLOGY
End: 2023-09-28

## 2023-09-28 DIAGNOSIS — L82.1 OTHER SEBORRHEIC KERATOSIS: ICD-10-CM

## 2023-09-28 DIAGNOSIS — Z85.828 PERSONAL HISTORY OF OTHER MALIGNANT NEOPLASM OF SKIN: ICD-10-CM

## 2023-09-28 DIAGNOSIS — L57.0 ACTINIC KERATOSIS: ICD-10-CM

## 2023-09-28 DIAGNOSIS — L57.8 OTHER SKIN CHANGES DUE TO CHRONIC EXPOSURE TO NONIONIZING RADIATION: ICD-10-CM

## 2023-09-28 DIAGNOSIS — L82.0 INFLAMED SEBORRHEIC KERATOSIS: ICD-10-CM

## 2023-09-28 DIAGNOSIS — L44.8 OTHER SPECIFIED PAPULOSQUAMOUS DISORDERS: ICD-10-CM

## 2023-09-28 PROCEDURE — 17000 DESTRUCT PREMALG LESION: CPT | Mod: 59

## 2023-09-28 PROCEDURE — 99213 OFFICE O/P EST LOW 20 MIN: CPT | Mod: 25

## 2023-09-28 PROCEDURE — 17003 DESTRUCT PREMALG LES 2-14: CPT | Mod: 59

## 2023-09-28 PROCEDURE — OTHER REASSURANCE: OTHER

## 2023-09-28 PROCEDURE — OTHER LIQUID NITROGEN: OTHER

## 2023-09-28 PROCEDURE — OTHER SUNSCREEN RECOMMENDATIONS: OTHER

## 2023-09-28 PROCEDURE — OTHER COUNSELING: OTHER

## 2023-09-28 PROCEDURE — 17110 DESTRUCT B9 LESION 1-14: CPT

## 2023-09-28 ASSESSMENT — LOCATION SIMPLE DESCRIPTION DERM
LOCATION SIMPLE: NOSE
LOCATION SIMPLE: LEFT BREAST
LOCATION SIMPLE: RIGHT UPPER ARM
LOCATION SIMPLE: ABDOMEN
LOCATION SIMPLE: RIGHT UPPER BACK

## 2023-09-28 ASSESSMENT — LOCATION ZONE DERM
LOCATION ZONE: TRUNK
LOCATION ZONE: NOSE
LOCATION ZONE: ARM

## 2023-09-28 ASSESSMENT — LOCATION DETAILED DESCRIPTION DERM
LOCATION DETAILED: NASAL DORSUM
LOCATION DETAILED: EPIGASTRIC SKIN
LOCATION DETAILED: LEFT RIB CAGE
LOCATION DETAILED: LEFT MEDIAL BREAST 10-11:00 REGION
LOCATION DETAILED: RIGHT ANTERIOR DISTAL UPPER ARM
LOCATION DETAILED: LEFT MEDIAL BREAST 11-12:00 REGION
LOCATION DETAILED: PERIUMBILICAL SKIN
LOCATION DETAILED: RIGHT LATERAL UPPER BACK

## 2023-09-28 ASSESSMENT — PAIN INTENSITY VAS
HOW INTENSE IS YOUR PAIN 0 BEING NO PAIN, 10 BEING THE MOST SEVERE PAIN POSSIBLE?: NO PAIN
HOW INTENSE IS YOUR PAIN 0 BEING NO PAIN, 10 BEING THE MOST SEVERE PAIN POSSIBLE?: 3/10 PAIN

## 2023-09-28 NOTE — PROCEDURE: LIQUID NITROGEN
Medical Necessity Information: It is in your best interest to select a reason for this procedure from the list below. All of these items fulfill various CMS LCD requirements except the new and changing color options.
Medical Necessity Clause: This procedure was medically necessary because the lesions that were treated were:
Add 52 Modifier (Optional): no
Spray Paint Text: The liquid nitrogen was applied to the skin utilizing a spray paint frosting technique.
Consent: The patient's consent was obtained including but not limited to risks of crusting, scabbing, blistering, scarring, darker or lighter pigmentary change, recurrence, incomplete removal and infection.
Show Topical Anesthesia Variable?: Yes
Post-Care Instructions: I reviewed with the patient in detail post-care instructions. Patient is to wear sunprotection, and avoid picking at any of the treated lesions. Pt may apply Vaseline to crusted or scabbing areas.
Detail Level: Detailed
Application Tool (Optional): Liquid Nitrogen Sprayer
Detail Level: Zone
Duration Of Freeze Thaw-Cycle (Seconds): 5
Number Of Freeze-Thaw Cycles: 2 freeze-thaw cycles
14-May-2019

## 2023-10-03 ENCOUNTER — HOSPITAL ENCOUNTER (OUTPATIENT)
Dept: RADIOLOGY | Facility: HOSPITAL | Age: 83
Discharge: HOME | End: 2023-10-03
Attending: FAMILY MEDICINE
Payer: COMMERCIAL

## 2023-10-03 DIAGNOSIS — Z78.0 ASYMPTOMATIC MENOPAUSAL STATE: ICD-10-CM

## 2023-10-03 PROCEDURE — 77080 DXA BONE DENSITY AXIAL: CPT

## 2023-10-11 NOTE — PROCEDURE: SUNSCREEN RECOMMENDATIONS

## 2024-03-08 ENCOUNTER — TRANSCRIBE ORDERS (OUTPATIENT)
Dept: SCHEDULING | Age: 84
End: 2024-03-08

## 2024-03-12 ENCOUNTER — TRANSCRIBE ORDERS (OUTPATIENT)
Dept: SCHEDULING | Age: 84
End: 2024-03-12

## 2024-03-12 DIAGNOSIS — Z86.000 PERSONAL HISTORY OF IN-SITU NEOPLASM OF BREAST: Primary | ICD-10-CM

## 2024-03-21 ENCOUNTER — HOSPITAL ENCOUNTER (OUTPATIENT)
Dept: RADIOLOGY | Facility: HOSPITAL | Age: 84
Discharge: HOME | End: 2024-03-21
Attending: FAMILY MEDICINE
Payer: COMMERCIAL

## 2024-03-21 DIAGNOSIS — Z86.000 PERSONAL HISTORY OF IN-SITU NEOPLASM OF BREAST: ICD-10-CM

## 2024-03-21 PROCEDURE — 77066 DX MAMMO INCL CAD BI: CPT

## 2024-03-21 PROCEDURE — G0279 TOMOSYNTHESIS, MAMMO: HCPCS

## 2024-03-28 ENCOUNTER — APPOINTMENT (OUTPATIENT)
Dept: URBAN - METROPOLITAN AREA CLINIC 203 | Age: 84
Setting detail: DERMATOLOGY
End: 2024-03-29

## 2024-03-28 DIAGNOSIS — Z85.828 PERSONAL HISTORY OF OTHER MALIGNANT NEOPLASM OF SKIN: ICD-10-CM

## 2024-03-28 DIAGNOSIS — L91.8 OTHER HYPERTROPHIC DISORDERS OF THE SKIN: ICD-10-CM

## 2024-03-28 DIAGNOSIS — L57.8 OTHER SKIN CHANGES DUE TO CHRONIC EXPOSURE TO NONIONIZING RADIATION: ICD-10-CM

## 2024-03-28 DIAGNOSIS — D485 NEOPLASM OF UNCERTAIN BEHAVIOR OF SKIN: ICD-10-CM

## 2024-03-28 DIAGNOSIS — L82.1 OTHER SEBORRHEIC KERATOSIS: ICD-10-CM

## 2024-03-28 PROBLEM — D48.5 NEOPLASM OF UNCERTAIN BEHAVIOR OF SKIN: Status: ACTIVE | Noted: 2024-03-28

## 2024-03-28 PROCEDURE — OTHER PHOTO-DOCUMENTATION: OTHER

## 2024-03-28 PROCEDURE — OTHER BIOPSY BY SHAVE METHOD: OTHER

## 2024-03-28 PROCEDURE — OTHER COUNSELING: OTHER

## 2024-03-28 PROCEDURE — OTHER SUNSCREEN RECOMMENDATIONS: OTHER

## 2024-03-28 PROCEDURE — OTHER REASSURANCE: OTHER

## 2024-03-28 PROCEDURE — 11102 TANGNTL BX SKIN SINGLE LES: CPT

## 2024-03-28 PROCEDURE — 99213 OFFICE O/P EST LOW 20 MIN: CPT | Mod: 25

## 2024-03-28 ASSESSMENT — LOCATION DETAILED DESCRIPTION DERM
LOCATION DETAILED: RIGHT SUPRAPUBIC SKIN
LOCATION DETAILED: LEFT MEDIAL BREAST 10-11:00 REGION
LOCATION DETAILED: LEFT INFRAMAMMARY CREASE (INNER QUADRANT)
LOCATION DETAILED: EPIGASTRIC SKIN
LOCATION DETAILED: PERIUMBILICAL SKIN
LOCATION DETAILED: RIGHT AXILLARY TAIL OF BREAST
LOCATION DETAILED: LEFT MEDIAL BREAST 11-12:00 REGION

## 2024-03-28 ASSESSMENT — PAIN INTENSITY VAS: HOW INTENSE IS YOUR PAIN 0 BEING NO PAIN, 10 BEING THE MOST SEVERE PAIN POSSIBLE?: NO PAIN

## 2024-03-28 ASSESSMENT — LOCATION SIMPLE DESCRIPTION DERM
LOCATION SIMPLE: RIGHT BREAST
LOCATION SIMPLE: GROIN
LOCATION SIMPLE: LEFT BREAST
LOCATION SIMPLE: ABDOMEN

## 2024-03-28 ASSESSMENT — LOCATION ZONE DERM: LOCATION ZONE: TRUNK

## 2024-03-28 NOTE — PROCEDURE: BIOPSY BY SHAVE METHOD
Detail Level: Detailed
Depth Of Biopsy: dermis
Was A Bandage Applied: Yes
Size Of Lesion In Cm: 0
Biopsy Type: H and E
Biopsy Method: Dermablade
Anesthesia Type: 1% lidocaine with epinephrine
Anesthesia Volume In Cc: 0.5
Hemostasis: Drysol
Wound Care: Petrolatum
Dressing: bandage
Destruction After The Procedure: No
Type Of Destruction Used: Curettage
Curettage Text: The wound bed was treated with curettage after the biopsy was performed.
Cryotherapy Text: The wound bed was treated with cryotherapy after the biopsy was performed.
Electrodesiccation Text: The wound bed was treated with electrodesiccation after the biopsy was performed.
Electrodesiccation And Curettage Text: The wound bed was treated with electrodesiccation and curettage after the biopsy was performed.
Silver Nitrate Text: The wound bed was treated with silver nitrate after the biopsy was performed.
Lab: [id number]
Consent: Written consent was obtained and risks were reviewed including but not limited to scarring, infection, bleeding, scabbing, incomplete removal, nerve damage and allergy to anesthesia.
Post-Care Instructions: I reviewed with the patient in detail post-care instructions. Patient is to keep the biopsy site dry overnight, and then apply bacitracin twice daily until healed. Patient may apply hydrogen peroxide soaks to remove any crusting.
Notification Instructions: Patient will be notified of biopsy results. However, patient instructed to call the office if not contacted within 2 weeks.
Billing Type: Third-Party Bill
Information: Selecting Yes will display possible errors in your note based on the variables you have selected. This validation is only offered as a suggestion for you. PLEASE NOTE THAT THE VALIDATION TEXT WILL BE REMOVED WHEN YOU FINALIZE YOUR NOTE. IF YOU WANT TO FAX A PRELIMINARY NOTE YOU WILL NEED TO TOGGLE THIS TO 'NO' IF YOU DO NOT WANT IT IN YOUR FAXED NOTE.

## 2024-08-20 ENCOUNTER — APPOINTMENT (OUTPATIENT)
Dept: LAB | Age: 84
End: 2024-08-20
Attending: FAMILY MEDICINE
Payer: COMMERCIAL

## 2024-08-20 ENCOUNTER — TRANSCRIBE ORDERS (OUTPATIENT)
Dept: LAB | Age: 84
End: 2024-08-20

## 2024-08-20 DIAGNOSIS — Z13.220 ENCOUNTER FOR SCREENING FOR LIPOID DISORDERS: Primary | ICD-10-CM

## 2024-08-20 DIAGNOSIS — M25.512 PAIN IN LEFT SHOULDER: ICD-10-CM

## 2024-08-20 DIAGNOSIS — Z13.220 ENCOUNTER FOR SCREENING FOR LIPOID DISORDERS: ICD-10-CM

## 2024-08-20 LAB
ALBUMIN SERPL-MCNC: 4.4 G/DL (ref 3.5–5.7)
ALP SERPL-CCNC: 80 IU/L (ref 34–125)
ALT SERPL-CCNC: 17 IU/L (ref 7–52)
ANION GAP SERPL CALC-SCNC: 7 MEQ/L (ref 3–15)
AST SERPL-CCNC: 18 IU/L (ref 13–39)
BASOPHILS # BLD: 0.04 K/UL (ref 0.01–0.1)
BASOPHILS NFR BLD: 0.4 %
BILIRUB SERPL-MCNC: 0.5 MG/DL (ref 0.3–1.2)
BUN SERPL-MCNC: 17 MG/DL (ref 7–25)
CALCIUM SERPL-MCNC: 9.7 MG/DL (ref 8.6–10.3)
CHLORIDE SERPL-SCNC: 104 MEQ/L (ref 98–107)
CHOLEST SERPL-MCNC: 242 MG/DL
CO2 SERPL-SCNC: 31 MEQ/L (ref 21–31)
CREAT SERPL-MCNC: 0.6 MG/DL (ref 0.6–1.2)
DIFFERENTIAL METHOD BLD: NORMAL
EGFRCR SERPLBLD CKD-EPI 2021: >60 ML/MIN/1.73M*2
EOSINOPHIL # BLD: 0.13 K/UL (ref 0.04–0.36)
EOSINOPHIL NFR BLD: 1.4 %
ERYTHROCYTE [DISTWIDTH] IN BLOOD BY AUTOMATED COUNT: 13.5 % (ref 11.7–14.4)
GLUCOSE SERPL-MCNC: 81 MG/DL (ref 70–99)
HCT VFR BLD AUTO: 43 % (ref 35–45)
HDLC SERPL-MCNC: 56 MG/DL
HDLC SERPL: 4.3 {RATIO}
HGB BLD-MCNC: 14.4 G/DL (ref 11.8–15.7)
IMM GRANULOCYTES # BLD AUTO: 0.04 K/UL (ref 0–0.08)
IMM GRANULOCYTES NFR BLD AUTO: 0.4 %
LDLC SERPL CALC-MCNC: 153 MG/DL
LYMPHOCYTES # BLD: 2.64 K/UL (ref 1.2–3.5)
LYMPHOCYTES NFR BLD: 28.4 %
MCH RBC QN AUTO: 31.3 PG (ref 28–33.2)
MCHC RBC AUTO-ENTMCNC: 33.5 G/DL (ref 32.2–35.5)
MCV RBC AUTO: 93.5 FL (ref 83–98)
MONOCYTES # BLD: 0.7 K/UL (ref 0.28–0.8)
MONOCYTES NFR BLD: 7.5 %
NEUTROPHILS # BLD: 5.75 K/UL (ref 1.7–7)
NEUTS SEG NFR BLD: 61.9 %
NONHDLC SERPL-MCNC: 186 MG/DL
NRBC BLD-RTO: 0 %
PDW BLD AUTO: 11.7 FL (ref 9.4–12.3)
PLATELET # BLD AUTO: 221 K/UL (ref 150–369)
POTASSIUM SERPL-SCNC: 3.7 MEQ/L (ref 3.5–5.1)
PROT SERPL-MCNC: 6.6 G/DL (ref 6–8.2)
RBC # BLD AUTO: 4.6 M/UL (ref 3.93–5.22)
SODIUM SERPL-SCNC: 142 MEQ/L (ref 136–145)
TRIGL SERPL-MCNC: 165 MG/DL
WBC # BLD AUTO: 9.3 K/UL (ref 3.8–10.5)

## 2024-08-20 PROCEDURE — 36415 COLL VENOUS BLD VENIPUNCTURE: CPT

## 2024-08-20 PROCEDURE — 80053 COMPREHEN METABOLIC PANEL: CPT

## 2024-08-20 PROCEDURE — 85025 COMPLETE CBC W/AUTO DIFF WBC: CPT

## 2024-08-20 PROCEDURE — 80061 LIPID PANEL: CPT

## 2024-09-19 ENCOUNTER — APPOINTMENT (OUTPATIENT)
Dept: URBAN - METROPOLITAN AREA CLINIC 203 | Age: 84
Setting detail: DERMATOLOGY
End: 2024-09-26

## 2024-09-19 DIAGNOSIS — Z85.828 PERSONAL HISTORY OF OTHER MALIGNANT NEOPLASM OF SKIN: ICD-10-CM

## 2024-09-19 DIAGNOSIS — D18.0 HEMANGIOMA: ICD-10-CM

## 2024-09-19 DIAGNOSIS — L82.0 INFLAMED SEBORRHEIC KERATOSIS: ICD-10-CM

## 2024-09-19 DIAGNOSIS — L57.8 OTHER SKIN CHANGES DUE TO CHRONIC EXPOSURE TO NONIONIZING RADIATION: ICD-10-CM

## 2024-09-19 DIAGNOSIS — D22 MELANOCYTIC NEVI: ICD-10-CM

## 2024-09-19 DIAGNOSIS — L81.4 OTHER MELANIN HYPERPIGMENTATION: ICD-10-CM

## 2024-09-19 PROBLEM — D18.01 HEMANGIOMA OF SKIN AND SUBCUTANEOUS TISSUE: Status: ACTIVE | Noted: 2024-09-19

## 2024-09-19 PROBLEM — D22.5 MELANOCYTIC NEVI OF TRUNK: Status: ACTIVE | Noted: 2024-09-19

## 2024-09-19 PROCEDURE — OTHER COUNSELING: OTHER

## 2024-09-19 PROCEDURE — OTHER LIQUID NITROGEN: OTHER

## 2024-09-19 PROCEDURE — OTHER SUNSCREEN RECOMMENDATIONS: OTHER

## 2024-09-19 PROCEDURE — 99213 OFFICE O/P EST LOW 20 MIN: CPT | Mod: 25

## 2024-09-19 PROCEDURE — 17110 DESTRUCT B9 LESION 1-14: CPT

## 2024-09-19 PROCEDURE — OTHER REASSURANCE: OTHER

## 2024-09-19 ASSESSMENT — LOCATION DETAILED DESCRIPTION DERM
LOCATION DETAILED: RIGHT INFRAMAMMARY CREASE (INNER QUADRANT)
LOCATION DETAILED: RIGHT SUPERIOR UPPER BACK
LOCATION DETAILED: LEFT SUPERIOR LATERAL FOREHEAD
LOCATION DETAILED: RIGHT SUPERIOR LATERAL NECK
LOCATION DETAILED: LEFT SUPERIOR LATERAL UPPER BACK
LOCATION DETAILED: LEFT SUPERIOR UPPER BACK
LOCATION DETAILED: SUBXIPHOID
LOCATION DETAILED: RIGHT SUPERIOR ANTERIOR NECK
LOCATION DETAILED: LEFT MEDIAL SUPERIOR CHEST
LOCATION DETAILED: RIGHT SUPERIOR MEDIAL UPPER BACK
LOCATION DETAILED: EPIGASTRIC SKIN
LOCATION DETAILED: LEFT MEDIAL INFERIOR CHEST
LOCATION DETAILED: PERIUMBILICAL SKIN
LOCATION DETAILED: LEFT RIB CAGE

## 2024-09-19 ASSESSMENT — LOCATION SIMPLE DESCRIPTION DERM
LOCATION SIMPLE: RIGHT UPPER BACK
LOCATION SIMPLE: RIGHT ANTERIOR NECK
LOCATION SIMPLE: LEFT FOREHEAD
LOCATION SIMPLE: RIGHT BREAST
LOCATION SIMPLE: LEFT UPPER BACK
LOCATION SIMPLE: ABDOMEN
LOCATION SIMPLE: CHEST

## 2024-09-19 ASSESSMENT — LOCATION ZONE DERM
LOCATION ZONE: TRUNK
LOCATION ZONE: FACE
LOCATION ZONE: NECK

## 2024-09-19 NOTE — PROCEDURE: LIQUID NITROGEN
Show Aperture Variable?: Yes
Include Z78.9 (Other Specified Conditions Influencing Health Status) As An Associated Diagnosis?: No
Number Of Freeze-Thaw Cycles: 3 freeze-thaw cycles
Medical Necessity Clause: This procedure was medically necessary because the lesions that were treated were:
Detail Level: Detailed
Post-Care Instructions: I reviewed with the patient in detail post-care instructions. Patient is to wear sunprotection, and avoid picking at any of the treated lesions. Pt may apply Vaseline to crusted or scabbing areas.
Medical Necessity Information: It is in your best interest to select a reason for this procedure from the list below. All of these items fulfill various CMS LCD requirements except the new and changing color options.
Spray Paint Text: The liquid nitrogen was applied to the skin utilizing a spray paint frosting technique.
Consent: The patient's consent was obtained including but not limited to risks of crusting, scabbing, blistering, scarring, darker or lighter pigmentary change, recurrence, incomplete removal and infection.

## 2024-12-12 ENCOUNTER — TRANSCRIBE ORDERS (OUTPATIENT)
Dept: SCHEDULING | Age: 84
End: 2024-12-12

## 2024-12-12 DIAGNOSIS — Z85.3 PERSONAL HISTORY OF MALIGNANT NEOPLASM OF BREAST: Primary | ICD-10-CM

## 2024-12-17 ENCOUNTER — HOSPITAL ENCOUNTER (OUTPATIENT)
Dept: RADIOLOGY | Facility: HOSPITAL | Age: 84
Discharge: HOME | End: 2024-12-17
Attending: FAMILY MEDICINE
Payer: COMMERCIAL

## 2024-12-17 DIAGNOSIS — Z85.3 PERSONAL HISTORY OF MALIGNANT NEOPLASM OF BREAST: ICD-10-CM

## 2025-03-20 ENCOUNTER — APPOINTMENT (OUTPATIENT)
Dept: URBAN - METROPOLITAN AREA CLINIC 203 | Age: 85
Setting detail: DERMATOLOGY
End: 2025-03-20

## 2025-03-20 DIAGNOSIS — Z85.828 PERSONAL HISTORY OF OTHER MALIGNANT NEOPLASM OF SKIN: ICD-10-CM

## 2025-03-20 DIAGNOSIS — L82.1 OTHER SEBORRHEIC KERATOSIS: ICD-10-CM

## 2025-03-20 DIAGNOSIS — L81.4 OTHER MELANIN HYPERPIGMENTATION: ICD-10-CM

## 2025-03-20 DIAGNOSIS — D18.0 HEMANGIOMA: ICD-10-CM

## 2025-03-20 DIAGNOSIS — D22 MELANOCYTIC NEVI: ICD-10-CM

## 2025-03-20 DIAGNOSIS — L57.8 OTHER SKIN CHANGES DUE TO CHRONIC EXPOSURE TO NONIONIZING RADIATION: ICD-10-CM

## 2025-03-20 PROBLEM — D22.5 MELANOCYTIC NEVI OF TRUNK: Status: ACTIVE | Noted: 2025-03-20

## 2025-03-20 PROBLEM — D18.01 HEMANGIOMA OF SKIN AND SUBCUTANEOUS TISSUE: Status: ACTIVE | Noted: 2025-03-20

## 2025-03-20 PROCEDURE — OTHER SUNSCREEN RECOMMENDATIONS: OTHER

## 2025-03-20 PROCEDURE — OTHER REASSURANCE: OTHER

## 2025-03-20 PROCEDURE — 99213 OFFICE O/P EST LOW 20 MIN: CPT

## 2025-03-20 PROCEDURE — OTHER COUNSELING: OTHER

## 2025-03-20 ASSESSMENT — LOCATION DETAILED DESCRIPTION DERM
LOCATION DETAILED: RIGHT PROXIMAL DORSAL FOREARM
LOCATION DETAILED: LEFT RIB CAGE
LOCATION DETAILED: INFERIOR THORACIC SPINE
LOCATION DETAILED: LEFT PROXIMAL DORSAL FOREARM
LOCATION DETAILED: LEFT MEDIAL INFERIOR CHEST
LOCATION DETAILED: LEFT CENTRAL MALAR CHEEK
LOCATION DETAILED: LEFT CENTRAL BUCCAL CHEEK
LOCATION DETAILED: RIGHT PROXIMAL RADIAL DORSAL FOREARM
LOCATION DETAILED: LEFT MEDIAL SUPERIOR CHEST
LOCATION DETAILED: RIGHT SUPERIOR MEDIAL UPPER BACK

## 2025-03-20 ASSESSMENT — LOCATION ZONE DERM
LOCATION ZONE: ARM
LOCATION ZONE: TRUNK
LOCATION ZONE: FACE

## 2025-03-20 ASSESSMENT — LOCATION SIMPLE DESCRIPTION DERM
LOCATION SIMPLE: UPPER BACK
LOCATION SIMPLE: ABDOMEN
LOCATION SIMPLE: CHEST
LOCATION SIMPLE: RIGHT UPPER BACK
LOCATION SIMPLE: RIGHT FOREARM
LOCATION SIMPLE: LEFT FOREARM
LOCATION SIMPLE: LEFT CHEEK

## (undated) DEVICE — PENEVAC1 NONSTICK SMOKE EVAC

## (undated) DEVICE — SOLN IRRIG STER WATER 1000ML

## (undated) DEVICE — GLOVE SZ 7.5 PROTEXIS PI

## (undated) DEVICE — PACK RFID MLHS MINOR PROC STDZ

## (undated) DEVICE — ***USE 56900*** SUTURE CHROMIC GUT 3-0 G122H

## (undated) DEVICE — APPLICATOR CHLORAPREP 26ML ORANGE TINT

## (undated) DEVICE — ***USE 138505*** SUTURE PROLENE 3-0  8663G

## (undated) DEVICE — BLANKET LOWER BODY

## (undated) DEVICE — ***USE 57698*** SLEEVE FLOWTRON DVT CALF SINGLE USE

## (undated) DEVICE — ***USE 138597*** SUTURE SILK  0   678G

## (undated) DEVICE — MANIFOLD SINGLE PORT NEPTUNE

## (undated) DEVICE — SOLN IRRIG .9%SOD 500ML

## (undated) DEVICE — ***USE 132714***BANDAGE ELASTIC 6IN MEDICHOICE

## (undated) DEVICE — GLOVE SZ 7.5 LINER PROTEXIS PI BL

## (undated) DEVICE — PAD GROUND ELECTROSURGICAL W/CORD

## (undated) DEVICE — MASTISOL LIQUID ADHESIVE

## (undated) DEVICE — DRESSING SPONGE GAUZE 4X4 STER